# Patient Record
Sex: MALE | Race: WHITE | NOT HISPANIC OR LATINO | Employment: OTHER | URBAN - NONMETROPOLITAN AREA
[De-identification: names, ages, dates, MRNs, and addresses within clinical notes are randomized per-mention and may not be internally consistent; named-entity substitution may affect disease eponyms.]

---

## 2017-07-18 ENCOUNTER — OFFICE VISIT (OUTPATIENT)
Dept: CARDIOLOGY | Facility: CLINIC | Age: 72
End: 2017-07-18

## 2017-07-18 VITALS
HEART RATE: 74 BPM | SYSTOLIC BLOOD PRESSURE: 136 MMHG | HEIGHT: 66 IN | DIASTOLIC BLOOD PRESSURE: 81 MMHG | OXYGEN SATURATION: 97 % | BODY MASS INDEX: 25.84 KG/M2 | WEIGHT: 160.8 LBS

## 2017-07-18 DIAGNOSIS — J44.9 CHRONIC OBSTRUCTIVE PULMONARY DISEASE, UNSPECIFIED COPD TYPE (HCC): ICD-10-CM

## 2017-07-18 DIAGNOSIS — R06.02 SHORTNESS OF BREATH: ICD-10-CM

## 2017-07-18 DIAGNOSIS — Z76.89 ENCOUNTER TO ESTABLISH CARE WITH NEW DOCTOR: ICD-10-CM

## 2017-07-18 DIAGNOSIS — I10 ESSENTIAL HYPERTENSION: ICD-10-CM

## 2017-07-18 DIAGNOSIS — I25.119 CORONARY ARTERY DISEASE INVOLVING NATIVE CORONARY ARTERY OF NATIVE HEART WITH ANGINA PECTORIS (HCC): Primary | ICD-10-CM

## 2017-07-18 DIAGNOSIS — Z72.0 TOBACCO USE: ICD-10-CM

## 2017-07-18 DIAGNOSIS — E78.2 MIXED HYPERLIPIDEMIA: ICD-10-CM

## 2017-07-18 PROBLEM — I25.10 CORONARY ARTERY DISEASE: Status: ACTIVE | Noted: 2017-07-18

## 2017-07-18 PROBLEM — E11.9 DIABETES (HCC): Status: ACTIVE | Noted: 2017-07-18

## 2017-07-18 PROCEDURE — 99204 OFFICE O/P NEW MOD 45 MIN: CPT | Performed by: INTERNAL MEDICINE

## 2017-07-18 RX ORDER — CARVEDILOL 3.12 MG/1
3.12 TABLET ORAL 2 TIMES DAILY WITH MEALS
COMMUNITY
End: 2017-07-18

## 2017-07-18 RX ORDER — ATORVASTATIN CALCIUM 40 MG/1
40 TABLET, FILM COATED ORAL DAILY
Qty: 90 TABLET | Refills: 3 | Status: SHIPPED | OUTPATIENT
Start: 2017-07-18 | End: 2017-07-18 | Stop reason: SDUPTHER

## 2017-07-18 RX ORDER — LISINOPRIL 10 MG/1
10 TABLET ORAL
COMMUNITY
End: 2021-05-13 | Stop reason: DRUGHIGH

## 2017-07-18 RX ORDER — CLOPIDOGREL BISULFATE 75 MG/1
75 TABLET ORAL DAILY
COMMUNITY
End: 2017-10-26 | Stop reason: SINTOL

## 2017-07-18 RX ORDER — CARVEDILOL 6.25 MG/1
6.25 TABLET ORAL 2 TIMES DAILY WITH MEALS
Qty: 180 TABLET | Refills: 3 | Status: SHIPPED | OUTPATIENT
Start: 2017-07-18 | End: 2018-01-22 | Stop reason: SDUPTHER

## 2017-07-18 RX ORDER — SIMVASTATIN 20 MG
20 TABLET ORAL NIGHTLY
COMMUNITY
End: 2017-07-18

## 2017-07-18 RX ORDER — ATORVASTATIN CALCIUM 40 MG/1
40 TABLET, FILM COATED ORAL DAILY
Qty: 90 TABLET | Refills: 3 | Status: SHIPPED | OUTPATIENT
Start: 2017-07-18 | End: 2018-09-10 | Stop reason: SDUPTHER

## 2017-07-18 NOTE — PROGRESS NOTES
Subjective   Ronnell Dover is a 72 y.o. male     Chief Complaint   Patient presents with   • Establish Care     presents as a new patient    • Surgical Clearance     colonoscopy       PROBLEM LIST:     1. Coronary artery disease   1.1 Stenting in circumflex 08/2010 by Dr. MAHONEY and stenting RCA 10/20/2010 Dr. MAHONEY   2. COPD   3. Daily tobacco habituation; 1/2 PPD   4. Diabetes   5. Essential Hypertension   6. Mixed Hyperlipidemia     Specialty Problems     None            HPI:  Mr. Ronnell Dover is a 72-year-old white male for by Vanessa Castillo for preoperative risk assessment and to establish cardiac care.    Mr. Dover has known coronary artery disease.  He underwent stenting of the circumflex in the distant past, and stenting of the right coronary artery by Dr. Canela in 2015.  Circumstance was precipitated by angina.  Since his stenting procedure he is done well.  Currently, Mr. Dover denies any type of chest discomfort.  He relates orthopnea, PND, or lower extremity edema.  He has no palpitations, dizziness, presyncope, or syncope.    The patient describes one flight dyspnea but has been a lifelong smoker.  He frequently coughs and wheezes when he is short of breath.  He has had no recent change in his exercise capacity but describes a gradual decline in physical ability, and increase in exertional dyspnea over 2-3 years.  Mr. Dover does not claudicate, and he has no other symptoms of peripheral arterial disease or of arterial embolic events.  He recently stopped taking aspirin because of abdominal discomfort.              CURRENT MEDICATION:    Current Outpatient Prescriptions   Medication Sig Dispense Refill   • carvedilol (COREG) 6.25 MG tablet Take 1 tablet by mouth 2 (Two) Times a Day With Meals. 180 tablet 3   • clopidogrel (PLAVIX) 75 MG tablet Take 75 mg by mouth Daily.     • lisinopril (PRINIVIL,ZESTRIL) 10 MG tablet Take 10 mg by mouth Daily.     • metFORMIN (GLUCOPHAGE) 1000 MG tablet Take 1,000  mg by mouth 2 (Two) Times a Day With Meals.     • atorvastatin (LIPITOR) 40 MG tablet Take 1 tablet by mouth Daily. 90 tablet 3     No current facility-administered medications for this visit.        ALLERGIES:    Penicillins    PAST MEDICAL HISTORY:    Past Medical History:   Diagnosis Date   • Diabetes mellitus    • Hyperlipidemia    • Hypertension        SURGICAL HISTORY:    Past Surgical History:   Procedure Laterality Date   • CARDIAC CATHETERIZATION     • CORONARY STENT PLACEMENT         SOCIAL HISTORY:    Social History     Social History   • Marital status:      Spouse name: N/A   • Number of children: N/A   • Years of education: N/A     Occupational History   • Not on file.     Social History Main Topics   • Smoking status: Current Every Day Smoker     Packs/day: 1.00     Types: Cigarettes   • Smokeless tobacco: Never Used   • Alcohol use No   • Drug use: No   • Sexual activity: Defer     Other Topics Concern   • Not on file     Social History Narrative   • No narrative on file       FAMILY HISTORY:    Family History   Problem Relation Age of Onset   • No Known Problems Mother    • Heart disease Father        Review of Systems   Constitutional: Positive for fatigue and unexpected weight change ( has had weight change recently within the last year, is scheduled for a colonoscopy , 20 lb weight loss within last year ). Negative for chills and diaphoresis.   HENT: Positive for sore throat.    Eyes: Positive for visual disturbance (wears glasses).   Respiratory: Positive for cough (COPD), shortness of breath (with  min. exertion, progessively worsening, positive for orthopnea, onset 1 year ago ) and wheezing (COPD). Negative for choking, chest tightness and stridor.    Cardiovascular: Negative for chest pain, palpitations and leg swelling.   Gastrointestinal: Positive for abdominal pain (has had stomach pain, patient stopped asa 81mg on own and pain has resolved ) and constipation (occasional ). Negative  "for blood in stool (no melena, no hematuria, no hematemesis, no hematochezia ), diarrhea, nausea and vomiting.        Left sided pain, rates pain as 5, associated symptoms numbness/ burning.     Left sided hernia      Endocrine: Negative.  Negative for cold intolerance and heat intolerance.   Genitourinary: Negative.    Musculoskeletal: Positive for myalgias (nocturnal leg cramps ).   Skin: Negative.    Allergic/Immunologic: Negative.  Negative for environmental allergies, food allergies and immunocompromised state.   Neurological: Negative.  Negative for dizziness, seizures, syncope, facial asymmetry (no stroke like symptoms ), speech difficulty, weakness, light-headedness, numbness and headaches.   Hematological: Bruises/bleeds easily.   Psychiatric/Behavioral: Negative.        VISIT VITALS:    /81 (BP Location: Left arm, Patient Position: Sitting)  Pulse 74  Ht 66\" (167.6 cm)  Wt 160 lb 12.8 oz (72.9 kg)  SpO2 97%  BMI 25.95 kg/m2    RECENT LABS:    Objective       Physical Exam   Constitutional: He is oriented to person, place, and time. He appears well-developed and well-nourished. No distress.   HENT:   Head: Normocephalic and atraumatic.   Eyes: Conjunctivae, EOM and lids are normal. Pupils are equal, round, and reactive to light. Lids are everted and swept, no foreign bodies found.   Wears glasses  Negative ptosis   Negative arcus senilis   Negative lid lag      Neck: Normal range of motion. Neck supple. Normal carotid pulses, no hepatojugular reflux and no JVD present. Carotid bruit is not present. No thyroid mass and no thyromegaly present.   Cardiovascular: S1 normal, S2 normal, normal heart sounds, intact distal pulses and normal pulses.   No extrasystoles are present. Exam reveals no gallop, no S3, no S4, no distant heart sounds, no friction rub and no decreased pulses.    No murmur heard.   No systolic murmur is present    No diastolic murmur is present   Pulses:       Radial pulses are 2+ " on the right side, and 2+ on the left side.        Dorsalis pedis pulses are 2+ on the right side, and 2+ on the left side.        Posterior tibial pulses are 2+ on the right side, and 2+ on the left side.   Pulmonary/Chest: Effort normal. No respiratory distress. He has decreased breath sounds (moderately decreased, exp. phase mildly prolonged). He has no wheezes. He has no rhonchi. He has no rales. He exhibits no tenderness.   Abdominal: Soft. Bowel sounds are normal. He exhibits no distension, no abdominal bruit and no mass. There is no tenderness.   Negative organomegaly      Musculoskeletal: He exhibits no edema (capillary refill min. delayed about 7 seconds LLE, normal capillary refill RLE), tenderness or deformity.   Lymphadenopathy:     He has no cervical adenopathy.     He has no axillary adenopathy.   Neurological: He is alert and oriented to person, place, and time. He has normal reflexes.   Skin: Skin is warm. No rash noted. No erythema. No pallor.   Psychiatric: He has a normal mood and affect. His speech is normal and behavior is normal. Judgment and thought content normal. Cognition and memory are normal.   Nursing note and vitals reviewed.      Procedures      Assessment/Plan #1.  Mr. Dover is at low risk for colonoscopy in can stop his Plavix 5 days before that procedure.  He was cautioned to report immediately or activated emergency medical services for any recurrence of chest discomfort.    #2.  Mr. Dover does not need to be on dual antiplatelet therapy at this time.  We will continue Plavix for the present, may attempt to discontinue that medication and restart low-dose aspirin at the time of his next visit.    #3.  Coreg dosing is inappropriate.  We will increase Coreg to 6.25 mg twice daily and follow heart rate and blood pressure closely.    #4.  Mr. Dover has multivessel coronary artery disease.  He should be on high-dose statin.  We will stop simvastatin and start atorvastatin 40 mg  daily with fasting lipid profile liver enzymes in 6-8 weeks.  The patient was given precautions reference muscle and joint pain also weakness etc.    #5.  Mr. Dover will follow-up with Vanessa Castillo as instructed, and in our office in 2-3 months or on a when necessary basis for medication intolerance or symptoms as discussed in detail today.                 Diagnosis Plan   1. Coronary artery disease involving native coronary artery of native heart with angina pectoris  Lipid Panel    Comprehensive Metabolic Panel    Lipid Panel    Comprehensive Metabolic Panel   2. Tobacco use  Lipid Panel    Comprehensive Metabolic Panel    Lipid Panel    Comprehensive Metabolic Panel   3. Shortness of breath  Lipid Panel    Comprehensive Metabolic Panel    Lipid Panel    Comprehensive Metabolic Panel   4. Essential hypertension  Lipid Panel    Comprehensive Metabolic Panel    Lipid Panel    Comprehensive Metabolic Panel   5. Mixed hyperlipidemia  Lipid Panel    Comprehensive Metabolic Panel    Lipid Panel    Comprehensive Metabolic Panel   6. Encounter to establish care with new doctor     7. Chronic obstructive pulmonary disease, unspecified COPD type         Return in about 3 months (around 10/18/2017), or if symptoms worsen or fail to improve, for Next scheduled follow up.         Umberto Dover MD

## 2017-10-26 ENCOUNTER — OFFICE VISIT (OUTPATIENT)
Dept: CARDIOLOGY | Facility: CLINIC | Age: 72
End: 2017-10-26

## 2017-10-26 VITALS
BODY MASS INDEX: 26.2 KG/M2 | DIASTOLIC BLOOD PRESSURE: 72 MMHG | WEIGHT: 163 LBS | SYSTOLIC BLOOD PRESSURE: 145 MMHG | OXYGEN SATURATION: 94 % | HEART RATE: 66 BPM | HEIGHT: 66 IN

## 2017-10-26 DIAGNOSIS — I25.119 CORONARY ARTERY DISEASE INVOLVING NATIVE CORONARY ARTERY OF NATIVE HEART WITH ANGINA PECTORIS (HCC): ICD-10-CM

## 2017-10-26 DIAGNOSIS — E78.2 MIXED HYPERLIPIDEMIA: Primary | ICD-10-CM

## 2017-10-26 DIAGNOSIS — I10 ESSENTIAL HYPERTENSION: ICD-10-CM

## 2017-10-26 DIAGNOSIS — J44.9 CHRONIC OBSTRUCTIVE PULMONARY DISEASE, UNSPECIFIED COPD TYPE (HCC): ICD-10-CM

## 2017-10-26 DIAGNOSIS — R06.02 SHORTNESS OF BREATH: ICD-10-CM

## 2017-10-26 DIAGNOSIS — Z72.0 TOBACCO USE: ICD-10-CM

## 2017-10-26 PROCEDURE — 99214 OFFICE O/P EST MOD 30 MIN: CPT | Performed by: INTERNAL MEDICINE

## 2017-10-26 RX ORDER — ALBUTEROL SULFATE 2.5 MG/3ML
2.5 SOLUTION RESPIRATORY (INHALATION) AS NEEDED
COMMUNITY
Start: 2017-10-10 | End: 2021-05-13

## 2017-10-26 RX ORDER — BUDESONIDE AND FORMOTEROL FUMARATE DIHYDRATE 160; 4.5 UG/1; UG/1
2 AEROSOL RESPIRATORY (INHALATION)
COMMUNITY
Start: 2017-09-11 | End: 2021-05-13 | Stop reason: DRUGHIGH

## 2017-10-26 RX ORDER — ASPIRIN 81 MG/1
81 TABLET ORAL DAILY
Qty: 30 TABLET | Refills: 11 | Status: SHIPPED | OUTPATIENT
Start: 2017-10-26

## 2017-10-26 RX ORDER — ALBUTEROL SULFATE 90 UG/1
2 AEROSOL, METERED RESPIRATORY (INHALATION) AS NEEDED
Refills: 0 | COMMUNITY
Start: 2017-10-12 | End: 2019-01-21

## 2017-10-26 NOTE — PROGRESS NOTES
Subjective   Ronnell Dover is a 72 y.o. male     Chief Complaint   Patient presents with   • Follow-up     patient appears in office today for 3 month follow up    • Coronary Artery Disease     patient has H/O CAD ; states currently controlled with current medications       PROBLEM LIST:   1. Coronary artery disease   1.1 Stenting in circumflex 08/2010 by Dr. MAHONEY and stenting RCA 10/20/2010 Dr. MAHONEY   2. COPD   3. Daily tobacco habituation; 1/2 PPD   4. Diabetes   5. Essential Hypertension   6. Mixed Hyperlipidemia      Specialty Problems        Cardiology Problems    Coronary artery disease        Essential hypertension        Mixed hyperlipidemia                HPI:  Mr. Dover returns for follow-up on coronary artery disease and cardiac risk factor modification.    At the time of his last visit we cleared him for colonoscopy.  He had no difficulty with that procedure and had a single polyp removed.  Mr. Dover continues to be without chest pain.  He denies orthopnea, PND, or lower extremity edema although he sometimes wakes up at night short of breath coughing and wheezing.  He uses his inhalers during those periods in the symptoms rapidly resolved.  He denies palpitations, dizziness, presyncope, or syncope.  He denies claudication or other symptoms of peripheral arterial disease or of arterial embolic events.    Recent labs demonstrated fasting glucose of 160 BUN and creatinine of 12 and 1.02, HDL 40 LDL 58.      CURRENT MEDICATION:    Current Outpatient Prescriptions   Medication Sig Dispense Refill   • albuterol (PROVENTIL) (2.5 MG/3ML) 0.083% nebulizer solution Take 2.5 mg by nebulization As Needed for Wheezing or Shortness of Air.     • atorvastatin (LIPITOR) 40 MG tablet Take 1 tablet by mouth Daily. 90 tablet 3   • carvedilol (COREG) 6.25 MG tablet Take 1 tablet by mouth 2 (Two) Times a Day With Meals. 180 tablet 3   • lisinopril (PRINIVIL,ZESTRIL) 10 MG tablet Take 10 mg by mouth Daily.     • metFORMIN  (GLUCOPHAGE) 1000 MG tablet Take 1,000 mg by mouth 2 (Two) Times a Day With Meals.     • SYMBICORT 160-4.5 MCG/ACT inhaler Inhale 2 puffs 2 (Two) Times a Day.     • VENTOLIN  (90 Base) MCG/ACT inhaler Inhale 2 puffs As Needed for Wheezing or Shortness of Air.  0   • aspirin 81 MG EC tablet Take 1 tablet by mouth Daily. 30 tablet 11     No current facility-administered medications for this visit.        ALLERGIES:    Penicillins    PAST MEDICAL HISTORY:    Past Medical History:   Diagnosis Date   • COPD (chronic obstructive pulmonary disease)    • Coronary artery disease    • Diabetes mellitus    • Hyperlipidemia    • Hypertension        SURGICAL HISTORY:    Past Surgical History:   Procedure Laterality Date   • CARDIAC CATHETERIZATION     • COLONOSCOPY W/ BIOPSIES      2017   • CORONARY STENT PLACEMENT         SOCIAL HISTORY:    Social History     Social History   • Marital status:      Spouse name: N/A   • Number of children: N/A   • Years of education: N/A     Occupational History   • Not on file.     Social History Main Topics   • Smoking status: Current Every Day Smoker     Packs/day: 1.00     Types: Cigarettes   • Smokeless tobacco: Never Used   • Alcohol use No   • Drug use: No   • Sexual activity: Defer     Other Topics Concern   • Not on file     Social History Narrative       FAMILY HISTORY:    Family History   Problem Relation Age of Onset   • No Known Problems Mother    • Heart disease Father    • Stroke Brother        Review of Systems   Constitutional: Negative.  Negative for fatigue.   HENT: Positive for rhinorrhea (daily). Negative for congestion, nosebleeds, sinus pain, sinus pressure, sneezing and sore throat.    Eyes: Positive for visual disturbance (wears glasses daily).   Respiratory: Positive for cough (productive cough; H/O COPD/tobacco use), chest tightness (daily ; due to H/O COPD/tobacco use), shortness of breath (at rest on occasion and worse with exertion) and wheezing  "(daily; worse on exertion ; H/O COPD/tobacco use).         H/O COPD     Cardiovascular: Negative.  Negative for chest pain (denies CP, no failure synpmtoms), palpitations (denies palpitations) and leg swelling.   Gastrointestinal: Negative.  Negative for abdominal pain, constipation, diarrhea, nausea and vomiting.   Endocrine: Negative.  Negative for cold intolerance, heat intolerance, polyphagia and polyuria.   Genitourinary: Negative.  Negative for difficulty urinating, frequency and urgency.   Musculoskeletal: Positive for arthralgias (shoulders/hips). Negative for back pain, myalgias, neck pain and neck stiffness.   Skin: Negative.  Negative for rash and wound.   Allergic/Immunologic: Negative.  Negative for environmental allergies and food allergies.   Neurological: Negative.  Negative for dizziness, weakness, light-headedness and headaches.   Hematological: Bruises/bleeds easily (bruises and bleeds easily, ear bleeds on plavix).   Psychiatric/Behavioral: Negative for agitation, confusion and sleep disturbance (pt states he does wake up on occasion smothering/SOA). The patient is not nervous/anxious.        VISIT VITALS:    /72 (BP Location: Left arm, Patient Position: Sitting)  Pulse 66  Ht 66\" (167.6 cm)  Wt 163 lb (73.9 kg)  SpO2 94%  BMI 26.31 kg/m2    RECENT LABS:    Objective       Physical Exam   Constitutional: He is oriented to person, place, and time. He appears well-developed and well-nourished. No distress.   HENT:   Head: Normocephalic and atraumatic.   Eyes: Conjunctivae, EOM and lids are normal. Pupils are equal, round, and reactive to light. Lids are everted and swept, no foreign bodies found.   Neck: Normal range of motion. Neck supple. Normal carotid pulses, no hepatojugular reflux and no JVD present. Carotid bruit is present (soft right ). No tracheal deviation present. No thyroid mass and no thyromegaly present.   Cardiovascular: Normal rate, regular rhythm, S1 normal, S2 normal, " normal heart sounds and intact distal pulses.   No extrasystoles are present. Exam reveals no gallop, no S3, no S4, no distant heart sounds and no friction rub.    No murmur heard.   No systolic murmur is present    No diastolic murmur is present   Pulses:       Radial pulses are 2+ on the right side, and 2+ on the left side.        Dorsalis pedis pulses are 2+ on the right side, and 2+ on the left side.        Posterior tibial pulses are 2+ on the right side, and 2+ on the left side.   Pulmonary/Chest: Effort normal. He has decreased breath sounds (moderatly decreased, EXP. mild prolonged).   Abdominal: Soft. Bowel sounds are normal. He exhibits no distension and no mass. There is no tenderness. There is no rebound and no guarding.   Musculoskeletal: Normal range of motion. He exhibits no edema, tenderness or deformity.   Neurological: He is alert and oriented to person, place, and time.   Skin: Skin is warm and dry. No rash noted. No erythema. No pallor.   Psychiatric: He has a normal mood and affect. His behavior is normal. Judgment and thought content normal.   Nursing note and vitals reviewed.      Procedures      Assessment/Plan   #1.  Coronary artery disease currently asymptomatic of ischemia, heart failure, or dysrhythmia at moderate levels of physical activity.  The patient is on appropriate medications from the standpoint of risk factor modification, cholesterol is at goal, blood pressures only mildly elevated.    #2.  The pressures would need to be followed closely.  The patient is not consistently in the 120s in 130s in systole it would recommend medication increase.    #3.  Mr. Dover has tolerated change to atorvastatin well and has no myalgias or arthralgias.  We will continue in that regard.    #4.  The patient describes a recent bleed related to a act in his auditory canal.  He will trial stopping Plavix and go back to aspirin enteric-coated 81 mg daily with a meal.  He continues PPI therapy on an  as-needed basis for dyspepsia.    #5.  The standpoint of lifestyle modification continues cigarette smoking needs to be addressed.    #6.  The patient will follow-up with Vanessa Castillo as instructed, and in our office in one year or on a when necessary basis for symptoms as discussed in detail today.   Diagnosis Plan   1. Mixed hyperlipidemia     2. Essential hypertension     3. Coronary artery disease involving native coronary artery of native heart with angina pectoris     4. Shortness of breath     5. Chronic obstructive pulmonary disease, unspecified COPD type     6. Tobacco use         Return in about 1 year (around 10/26/2018), or if symptoms worsen or fail to improve, for Next scheduled follow up.         Umberto Dover MD

## 2018-01-22 ENCOUNTER — OFFICE VISIT (OUTPATIENT)
Dept: CARDIOLOGY | Facility: CLINIC | Age: 73
End: 2018-01-22

## 2018-01-22 VITALS
HEIGHT: 66 IN | HEART RATE: 82 BPM | WEIGHT: 161 LBS | BODY MASS INDEX: 25.88 KG/M2 | DIASTOLIC BLOOD PRESSURE: 79 MMHG | OXYGEN SATURATION: 91 % | SYSTOLIC BLOOD PRESSURE: 173 MMHG

## 2018-01-22 DIAGNOSIS — R07.2 PRECORDIAL PAIN: ICD-10-CM

## 2018-01-22 DIAGNOSIS — I25.119 CORONARY ARTERY DISEASE INVOLVING NATIVE CORONARY ARTERY OF NATIVE HEART WITH ANGINA PECTORIS (HCC): ICD-10-CM

## 2018-01-22 DIAGNOSIS — E78.2 MIXED HYPERLIPIDEMIA: ICD-10-CM

## 2018-01-22 DIAGNOSIS — I10 ESSENTIAL HYPERTENSION: Primary | ICD-10-CM

## 2018-01-22 DIAGNOSIS — R06.02 SOB (SHORTNESS OF BREATH): ICD-10-CM

## 2018-01-22 PROCEDURE — 99214 OFFICE O/P EST MOD 30 MIN: CPT | Performed by: PHYSICIAN ASSISTANT

## 2018-01-22 RX ORDER — CLONIDINE HYDROCHLORIDE 0.1 MG/1
TABLET ORAL
COMMUNITY
Start: 2018-01-15 | End: 2018-02-14

## 2018-01-22 RX ORDER — CLOPIDOGREL BISULFATE 75 MG/1
TABLET ORAL DAILY
COMMUNITY
Start: 2017-11-26

## 2018-01-22 RX ORDER — CARVEDILOL 6.25 MG/1
12.5 TABLET ORAL 2 TIMES DAILY WITH MEALS
Qty: 180 TABLET | Refills: 3 | Status: SHIPPED | OUTPATIENT
Start: 2018-01-22

## 2018-01-22 NOTE — PROGRESS NOTES
Problem list     Subjective   Ronnell Dover is a 73 y.o. male   CC:  Chest pain.  Chief Complaint   Patient presents with   • Hypertension     patient states he is having HTN with current medications   PROBLEM LIST:   1. Coronary artery disease   1.1 Stenting in circumflex 08/2010 by Dr. MAHONEY and stenting RCA 10/20/2010 Dr. MAHONEY   1.2 Stenting, 1999, inadequate data.  2. COPD   3. Daily tobacco habituation; 1/2 PPD   4. Diabetes   5. Essential Hypertension   6. Mixed Hyperlipidemia     HPI  The patient presents back today at his request.  He has started noticing left upper chest discomfort and shoulder discomfort as well.  He reports that he has had increasing dyspnea and fatigue.  He feels exactly like he did prior to his stenting in 2010 (of the circumflex and the RCA).  He's very concerned that he has unstable angina and would like to pursue further evaluation.  The patient has no failure or dysrhythmic symptoms.  He reports that his symptoms occur with exertion and are relieved with rest.  He has not taken nitroglycerin at this time.  The chest pain is occurring at lower levels of activity.  He has no further complaints otherwise.    Current Outpatient Prescriptions   Medication Sig Dispense Refill   • albuterol (PROVENTIL) (2.5 MG/3ML) 0.083% nebulizer solution Take 2.5 mg by nebulization As Needed for Wheezing or Shortness of Air.     • aspirin 81 MG EC tablet Take 1 tablet by mouth Daily. 30 tablet 11   • atorvastatin (LIPITOR) 40 MG tablet Take 1 tablet by mouth Daily. 90 tablet 3   • carvedilol (COREG) 6.25 MG tablet Take 2 tablets by mouth 2 (Two) Times a Day With Meals. 180 tablet 3   • CloNIDine (CATAPRES) 0.1 MG tablet prn     • clopidogrel (PLAVIX) 75 MG tablet Daily.     • lisinopril (PRINIVIL,ZESTRIL) 10 MG tablet Take 10 mg by mouth 2 (Two) Times a Day.     • metFORMIN (GLUCOPHAGE) 1000 MG tablet Take 1,000 mg by mouth 2 (Two) Times a Day With Meals.     • SYMBICORT 160-4.5 MCG/ACT inhaler Inhale 2  "puffs 2 (Two) Times a Day.     • VENTOLIN  (90 Base) MCG/ACT inhaler Inhale 2 puffs As Needed for Wheezing or Shortness of Air.  0     No current facility-administered medications for this visit.        Penicillins    Past Medical History:   Diagnosis Date   • Angina pectoris    • Cancer     Skin   • COPD (chronic obstructive pulmonary disease)    • Coronary artery disease    • Diabetes mellitus    • Hyperlipidemia    • Hypertension        Social History     Social History   • Marital status:      Spouse name: N/A   • Number of children: N/A   • Years of education: N/A     Occupational History   • Not on file.     Social History Main Topics   • Smoking status: Current Every Day Smoker     Packs/day: 1.00     Types: Cigarettes   • Smokeless tobacco: Never Used   • Alcohol use No   • Drug use: No   • Sexual activity: Defer     Other Topics Concern   • Not on file     Social History Narrative       Family History   Problem Relation Age of Onset   • No Known Problems Mother    • Heart disease Father    • Stroke Brother        Review of Systems   Constitutional: Positive for fatigue.   HENT: Negative.    Eyes: Positive for visual disturbance (glasses).   Respiratory: Shortness of breath: Hx COPD.    Cardiovascular: Positive for chest pain (left chest into shoulder). Negative for palpitations and leg swelling.   Gastrointestinal: Positive for abdominal pain.   Endocrine: Negative.    Genitourinary: Negative.    Musculoskeletal: Positive for arthralgias and myalgias.   Skin: Negative.    Allergic/Immunologic: Negative.    Neurological: Positive for dizziness, light-headedness and numbness (left hand).   Hematological: Bruises/bleeds easily.   Psychiatric/Behavioral: Positive for sleep disturbance.       Objective   Vitals:    01/22/18 1526   BP: 173/79   BP Location: Left arm   Patient Position: Sitting   Pulse: 82   SpO2: 91%   Weight: 73 kg (161 lb)   Height: 167.6 cm (65.98\")      /79 (BP Location: " "Left arm, Patient Position: Sitting)  Pulse 82  Ht 167.6 cm (65.98\")  Wt 73 kg (161 lb)  SpO2 91%  BMI 26 kg/m2   Lab Results (most recent)     None        Physical Exam   Constitutional: He is oriented to person, place, and time. He appears well-developed and well-nourished. No distress.   HENT:   Head: Normocephalic and atraumatic.   Eyes: Conjunctivae, EOM and lids are normal. Pupils are equal, round, and reactive to light. Lids are everted and swept, no foreign bodies found.   Neck: Normal range of motion. Neck supple. Normal carotid pulses, no hepatojugular reflux and no JVD present. Carotid bruit is present (soft right ). No tracheal deviation present. No thyroid mass and no thyromegaly present.   Cardiovascular: Normal rate, regular rhythm, S1 normal, S2 normal, normal heart sounds and intact distal pulses.   No extrasystoles are present. Exam reveals no gallop, no S3, no S4, no distant heart sounds and no friction rub.    No murmur heard.   No systolic murmur is present    No diastolic murmur is present   Pulses:       Radial pulses are 2+ on the right side, and 2+ on the left side.        Dorsalis pedis pulses are 2+ on the right side, and 2+ on the left side.        Posterior tibial pulses are 2+ on the right side, and 2+ on the left side.   Pulmonary/Chest: Effort normal. He has decreased breath sounds (moderatly decreased, EXP. mild prolonged).   Abdominal: Soft. Bowel sounds are normal. He exhibits no distension and no mass. There is no tenderness. There is no rebound and no guarding.   Musculoskeletal: Normal range of motion. He exhibits no edema, tenderness or deformity.   Neurological: He is alert and oriented to person, place, and time.   Skin: Skin is warm and dry. No rash noted. No erythema. No pallor.   Psychiatric: He has a normal mood and affect. His behavior is normal. Judgment and thought content normal.   Nursing note and vitals reviewed.        Procedure   Procedures   "     Assessment/Plan      Diagnosis Plan   1. Essential hypertension  Cardiac catheterization    carvedilol (COREG) 6.25 MG tablet   2. Precordial pain  Cardiac catheterization    carvedilol (COREG) 6.25 MG tablet   3. Coronary artery disease involving native coronary artery of native heart with angina pectoris  Cardiac catheterization    carvedilol (COREG) 6.25 MG tablet   4. Mixed hyperlipidemia  Cardiac catheterization    carvedilol (COREG) 6.25 MG tablet   5. SOB (shortness of breath)  Cardiac catheterization    carvedilol (COREG) 6.25 MG tablet       I feel that the patient is having symptoms compatible with unstable angina.  Symptoms mimic what he had prior to previous stenting as above.  He will restart Plavix.  He'll continue aspirin and statin therapy.  I will increase carvedilol to 12.5 mg twice a day for further cardioprotection and blood pressure control as well.  He will need definitive evaluation of coronary anatomy given low level symptoms.  He will be scheduled for catheterization.  Should he have symptoms before catheterization, he will proceed on to the emergency room.               Electronically signed by:

## 2018-01-22 NOTE — PATIENT INSTRUCTIONS
HOLD METFORMIN X 24 HOURS PRIOR TO CATH    CONT ASA AND PLAVIX    INCREASE CARVEDILOL TO 12.5 MG TWICE DAILY      Coronary Angiogram With Stent  Coronary angiogram with stent placement is a procedure to widen or open a narrow blood vessel of the heart (coronary artery). Arteries may become blocked by cholesterol buildup (plaques) in the lining or wall. When a coronary artery becomes partially blocked, blood flow to that area decreases. This may lead to chest pain or a heart attack (myocardial infarction).  A stent is a small piece of metal that looks like mesh or a spring. Stent placement may be done as treatment for a heart attack or right after a coronary angiogram in which a blocked artery is found.  Let your health care provider know about:  · Any allergies you have.  · All medicines you are taking, including vitamins, herbs, eye drops, creams, and over-the-counter medicines.  · Any problems you or family members have had with anesthetic medicines.  · Any blood disorders you have.  · Any surgeries you have had.  · Any medical conditions you have.  · Whether you are pregnant or may be pregnant.  What are the risks?  Generally, this is a safe procedure. However, problems may occur, including:  · Damage to the heart or its blood vessels.  · A return of blockage.  · Bleeding, infection, or bruising at the insertion site.  · A collection of blood under the skin (hematoma) at the insertion site.  · A blood clot in another part of the body.  · Kidney injury.  · Allergic reaction to the dye or contrast that is used.  · Bleeding into the abdomen (retroperitoneal bleeding).  What happens before the procedure?  Staying hydrated   Follow instructions from your health care provider about hydration, which may include:  · Up to 2 hours before the procedure - you may continue to drink clear liquids, such as water, clear fruit juice, black coffee, and plain tea.  Eating and drinking restrictions   Follow instructions from your  health care provider about eating and drinking, which may include:  · 8 hours before the procedure - stop eating heavy meals or foods such as meat, fried foods, or fatty foods.  · 6 hours before the procedure - stop eating light meals or foods, such as toast or cereal.  · 2 hours before the procedure - stop drinking clear liquids.  Ask your health care provider about:  · Changing or stopping your regular medicines. This is especially important if you are taking diabetes medicines or blood thinners.  · Taking medicines such as ibuprofen. These medicines can thin your blood. Do not take these medicines before your procedure if your health care provider instructs you not to. Generally, aspirin is recommended before a procedure of passing a small, thin tube (catheter) through a blood vessel and into the heart (cardiac catheterization).  What happens during the procedure?  · An IV tube will be inserted into one of your veins.  · You will be given one or more of the following:  ¨ A medicine to help you relax (sedative).  ¨ A medicine to numb the area where the catheter will be inserted into an artery (local anesthetic).  · To reduce your risk of infection:  ¨ Your health care team will wash or sanitize their hands.  ¨ Your skin will be washed with soap.  ¨ Hair may be removed from the area where the catheter will be inserted.  · Using a guide wire, the catheter will be inserted into an artery. The location may be in your groin, in your wrist, or in the fold of your arm (near your elbow).  · A type of X-ray (fluoroscopy) will be used to help guide the catheter to the opening of the arteries in the heart.  · A dye will be injected into the catheter, and X-rays will be taken. The dye will help to show where any narrowing or blockages are located in the arteries.  · A tiny wire will be guided to the blocked spot, and a balloon will be inflated to make the artery wider.  · The stent will be expanded and will crush the plaques  into the wall of the vessel. The stent will hold the area open and improve the blood flow. Most stents have a drug coating to reduce the risk of the stent narrowing over time.  · The artery may be made wider using a drill, laser, or other tools to remove plaques.  · When the blood flow is better, the catheter will be removed. The lining of the artery will grow over the stent, which stays where it was placed.  This procedure may vary among health care providers and hospitals.  What happens after the procedure?  · If the procedure is done through the leg, you will be kept in bed lying flat for about 6 hours. You will be instructed to not bend and not cross your legs.  · The insertion site will be checked frequently.  · The pulse in your foot or wrist will be checked frequently.  · You may have additional blood tests, X-rays, and a test that records the electrical activity of your heart (electrocardiogram, or ECG).  This information is not intended to replace advice given to you by your health care provider. Make sure you discuss any questions you have with your health care provider.  Document Released: 06/23/2004 Document Revised: 08/17/2017 Document Reviewed: 07/23/2017  Elsevier Interactive Patient Education © 2017 [employer]

## 2018-02-08 ENCOUNTER — OUTSIDE FACILITY SERVICE (OUTPATIENT)
Dept: CARDIOLOGY | Facility: CLINIC | Age: 73
End: 2018-02-08

## 2018-02-08 PROCEDURE — 93458 L HRT ARTERY/VENTRICLE ANGIO: CPT | Performed by: INTERNAL MEDICINE

## 2018-02-14 ENCOUNTER — OFFICE VISIT (OUTPATIENT)
Dept: CARDIOLOGY | Facility: CLINIC | Age: 73
End: 2018-02-14

## 2018-02-14 VITALS
OXYGEN SATURATION: 93 % | HEIGHT: 66 IN | SYSTOLIC BLOOD PRESSURE: 146 MMHG | BODY MASS INDEX: 26.03 KG/M2 | DIASTOLIC BLOOD PRESSURE: 75 MMHG | HEART RATE: 71 BPM | WEIGHT: 162 LBS

## 2018-02-14 DIAGNOSIS — I10 ESSENTIAL HYPERTENSION: ICD-10-CM

## 2018-02-14 DIAGNOSIS — R06.02 SHORTNESS OF BREATH: ICD-10-CM

## 2018-02-14 DIAGNOSIS — I25.10 CORONARY ARTERY DISEASE INVOLVING NATIVE CORONARY ARTERY OF NATIVE HEART WITHOUT ANGINA PECTORIS: Primary | ICD-10-CM

## 2018-02-14 DIAGNOSIS — J44.9 CHRONIC OBSTRUCTIVE PULMONARY DISEASE, UNSPECIFIED COPD TYPE (HCC): ICD-10-CM

## 2018-02-14 DIAGNOSIS — R07.2 PRECORDIAL PAIN: Primary | ICD-10-CM

## 2018-02-14 PROCEDURE — 99213 OFFICE O/P EST LOW 20 MIN: CPT | Performed by: PHYSICIAN ASSISTANT

## 2018-02-14 RX ORDER — AMLODIPINE BESYLATE 5 MG/1
TABLET ORAL DAILY
Refills: 0 | COMMUNITY
Start: 2018-01-27 | End: 2018-02-14 | Stop reason: SDUPTHER

## 2018-02-14 RX ORDER — NITROGLYCERIN 0.4 MG/1
0.4 TABLET SUBLINGUAL
Qty: 25 TABLET | Refills: 3 | Status: SHIPPED | OUTPATIENT
Start: 2018-02-14

## 2018-02-14 RX ORDER — AMLODIPINE BESYLATE 5 MG/1
5 TABLET ORAL DAILY
Qty: 90 TABLET | Refills: 3 | Status: SHIPPED | OUTPATIENT
Start: 2018-02-14 | End: 2019-01-21

## 2018-02-14 RX ORDER — HYDROCHLOROTHIAZIDE 25 MG/1
25 TABLET ORAL DAILY
Qty: 30 TABLET | Refills: 11 | Status: SHIPPED | OUTPATIENT
Start: 2018-02-14 | End: 2018-02-20 | Stop reason: SDUPTHER

## 2018-02-14 RX ORDER — NITROGLYCERIN 0.4 MG/1
0.4 TABLET SUBLINGUAL
Qty: 25 TABLET | Refills: 1 | Status: SHIPPED | OUTPATIENT
Start: 2018-02-14 | End: 2018-02-14 | Stop reason: SDUPTHER

## 2018-02-14 RX ORDER — NITROGLYCERIN 0.4 MG/1
0.4 TABLET SUBLINGUAL
COMMUNITY
End: 2018-02-14 | Stop reason: SDUPTHER

## 2018-02-14 NOTE — PROGRESS NOTES
Problem list     Subjective   Ronnell Dover is a 73 y.o. male     Chief Complaint   Patient presents with   • Coronary Artery Disease     Here for heart cath. f/u   • Hypertension   • Hyperlipidemia   • COPD   • Diabetes       HPI    PROBLEM LIST:   1. Coronary artery disease   1.1 Stenting in circumflex 08/2010 by Dr. MAHONEY and stenting RCA 10/20/2010 Dr. MAHONEY   1.2 Stenting, 1999, inadequate data.  1.3 cardiac catheterization February 2018 demonstrating a chronic total occlusion of the right coronary artery with collateralization noted.  Nonobstructive disease otherwise and medical measures recommended  2. COPD   3. Daily tobacco habituation; 1/2 PPD   4. Diabetes   5. Essential Hypertension   6. Mixed Hyperlipidemia      Patient is a 73-year-old male that presents back from catheterization.  Cardiac catheterization demonstrated a chronic total occlusion of the right coronary artery.  Nonobstructive disease otherwise.  Collateralization noted with medical management recommended.    Patient describes feeling well.  He has no chest pain or pressure.  He is arm pain has even improved.  He has moderate levels of dyspnea but describes that being improved with inhaled corticosteroids.  He does occasionally sense orthopnea.  He doesn't palpitate or have dysrhythmic symptoms.  Otherwise is doing well      Outpatient Encounter Prescriptions as of 2/14/2018   Medication Sig Dispense Refill   • albuterol (PROVENTIL) (2.5 MG/3ML) 0.083% nebulizer solution Take 2.5 mg by nebulization As Needed for Wheezing or Shortness of Air.     • amLODIPine (NORVASC) 5 MG tablet Daily.  0   • aspirin 81 MG EC tablet Take 1 tablet by mouth Daily. 30 tablet 11   • atorvastatin (LIPITOR) 40 MG tablet Take 1 tablet by mouth Daily. 90 tablet 3   • carvedilol (COREG) 6.25 MG tablet Take 2 tablets by mouth 2 (Two) Times a Day With Meals. 180 tablet 3   • clopidogrel (PLAVIX) 75 MG tablet Daily.     • ipratropium (ATROVENT) 0.02 % nebulizer solution  Take 2.5 mL by nebulization 2 (Two) Times a Day. prn 120 mL 3   • lisinopril (PRINIVIL,ZESTRIL) 10 MG tablet Take 10 mg by mouth 2 (Two) Times a Day.     • metFORMIN (GLUCOPHAGE) 1000 MG tablet Take 1,000 mg by mouth 2 (Two) Times a Day With Meals.     • nitroglycerin (NITROSTAT) 0.4 MG SL tablet Place 1 tablet under the tongue Every 5 (Five) Minutes As Needed for Chest Pain. Take no more than 3 doses in 15 minutes. 25 tablet 1   • SYMBICORT 160-4.5 MCG/ACT inhaler Inhale 2 puffs 2 (Two) Times a Day.     • VENTOLIN  (90 Base) MCG/ACT inhaler Inhale 2 puffs As Needed for Wheezing or Shortness of Air.  0   • [DISCONTINUED] ipratropium (ATROVENT) 0.02 % nebulizer solution prn  0   • [DISCONTINUED] nitroglycerin (NITROSTAT) 0.4 MG SL tablet Place 0.4 mg under the tongue Every 5 (Five) Minutes As Needed for Chest Pain. Take no more than 3 doses in 15 minutes.     • hydrochlorothiazide (HYDRODIURIL) 25 MG tablet Take 1 tablet by mouth Daily. 30 tablet 11   • [DISCONTINUED] CloNIDine (CATAPRES) 0.1 MG tablet prn       No facility-administered encounter medications on file as of 2/14/2018.        Penicillins    Past Medical History:   Diagnosis Date   • Angina pectoris    • Cancer     Skin   • COPD (chronic obstructive pulmonary disease)    • Coronary artery disease    • Diabetes mellitus    • Hyperlipidemia    • Hypertension        Social History     Social History   • Marital status:      Spouse name: N/A   • Number of children: N/A   • Years of education: N/A     Occupational History   • Not on file.     Social History Main Topics   • Smoking status: Current Every Day Smoker     Packs/day: 1.00     Types: Cigarettes   • Smokeless tobacco: Never Used   • Alcohol use No   • Drug use: No   • Sexual activity: Defer     Other Topics Concern   • Not on file     Social History Narrative       Family History   Problem Relation Age of Onset   • No Known Problems Mother    • Heart disease Father    • Stroke Brother   "      Review of Systems   Constitutional: Negative.    HENT: Negative.    Eyes: Positive for visual disturbance (glasses).   Respiratory: Positive for shortness of breath.    Cardiovascular: Negative.    Gastrointestinal: Negative.    Endocrine: Negative.    Genitourinary: Negative.    Musculoskeletal: Positive for arthralgias and myalgias.   Skin: Negative.    Allergic/Immunologic: Negative.    Neurological: Negative.    Hematological: Bruises/bleeds easily.   Psychiatric/Behavioral: Positive for sleep disturbance.       Objective   Vitals:    02/14/18 1055   BP: 146/75   BP Location: Left arm   Patient Position: Sitting   Pulse: 71   SpO2: 93%   Weight: 73.5 kg (162 lb)   Height: 167.6 cm (65.98\")      /75 (BP Location: Left arm, Patient Position: Sitting)  Pulse 71  Ht 167.6 cm (65.98\")  Wt 73.5 kg (162 lb)  SpO2 93%  BMI 26.16 kg/m2    Lab Results (most recent)     None          Physical Exam   Constitutional: He is oriented to person, place, and time. He appears well-developed and well-nourished. No distress.   HENT:   Head: Normocephalic and atraumatic.   Eyes: EOM are normal. Pupils are equal, round, and reactive to light.   Neck: No JVD present.   Cardiovascular: Normal rate, regular rhythm, normal heart sounds and intact distal pulses.  Exam reveals no gallop and no friction rub.    No murmur heard.  Pulmonary/Chest: Effort normal and breath sounds normal. No respiratory distress. He has no wheezes. He has no rales. He exhibits no tenderness.   Musculoskeletal: Normal range of motion. He exhibits no edema.   Neurological: He is alert and oriented to person, place, and time. No cranial nerve deficit.   Skin: Skin is warm and dry. No rash noted. No erythema. No pallor.   Psychiatric: He has a normal mood and affect. His behavior is normal.   Nursing note and vitals reviewed.      Procedure   Procedures       Assessment/Plan     Problems Addressed this Visit        Cardiovascular and Mediastinum    " Coronary artery disease - Primary    Relevant Medications    amLODIPine (NORVASC) 5 MG tablet    nitroglycerin (NITROSTAT) 0.4 MG SL tablet    Other Relevant Orders    Basic Metabolic Panel    Essential hypertension    Relevant Medications    amLODIPine (NORVASC) 5 MG tablet    hydrochlorothiazide (HYDRODIURIL) 25 MG tablet    Other Relevant Orders    Basic Metabolic Panel       Respiratory    Shortness of breath    Relevant Orders    Basic Metabolic Panel            Recommendation  1.  We have had a detailed discussion the Dr. Dover also came in for a few minutes to discussed with patient as well.  We do not feel it would be advantageous to refer to a tertiary center for recanalization of the chronic total occlusion based on the fact the patient is asymptomatic.  Therefore, we will continue him.  Therapy.  We would like to try diuretics to help with decreased post stress because of patient's and diastolic pressure of 22 mmHg.  We will start hydrochlorothiazide 25 mg and we'll check a BMP in one week.  2.  Otherwise he is on appropriate medications.  We will see back for follow-up in 2-3 months.  For any change in symptoms, he will contact her office.  Follow-up with primary as scheduled.         Electronically signed by:

## 2018-02-14 NOTE — TELEPHONE ENCOUNTER
PATIENT NEEDING REFILLS ON HIS AMLODIPINE, IPRATROPIUM 0.02%, AND NTG. 04. REFILLED PER REFILL PROTOCOL. PH,LPN

## 2018-02-19 ENCOUNTER — TELEPHONE (OUTPATIENT)
Dept: CARDIOLOGY | Facility: CLINIC | Age: 73
End: 2018-02-19

## 2018-02-19 DIAGNOSIS — I25.10 CORONARY ARTERY DISEASE DUE TO CALCIFIED CORONARY LESION: Primary | ICD-10-CM

## 2018-02-19 DIAGNOSIS — I10 ESSENTIAL HYPERTENSION: ICD-10-CM

## 2018-02-19 DIAGNOSIS — E13.8 DIABETES MELLITUS OF OTHER TYPE WITH COMPLICATION, UNSPECIFIED LONG TERM INSULIN USE STATUS: ICD-10-CM

## 2018-02-19 DIAGNOSIS — E87.1 HYPONATREMIA: ICD-10-CM

## 2018-02-19 DIAGNOSIS — R06.00 DYSPNEA, UNSPECIFIED TYPE: ICD-10-CM

## 2018-02-19 DIAGNOSIS — I25.84 CORONARY ARTERY DISEASE DUE TO CALCIFIED CORONARY LESION: Primary | ICD-10-CM

## 2018-02-19 NOTE — TELEPHONE ENCOUNTER
----- Message from EMANUEL Xiao sent at 2/19/2018  3:41 PM EST -----  Patient started on hydrochlorothiazide because of excess fluid.  Sodium is 133 which is borderline.  Call the patient and let him know we will recheck in 2 weeks to make sure his sodium doesn't continue to decrease.  Renal function is fine  ----- Message -----     From: Bibi Gonzáles     Sent: 2/19/2018   2:10 PM       To: EMANUEL Xiao

## 2018-02-19 NOTE — TELEPHONE ENCOUNTER
PATIENT AWARE NA LITTLE LOW PER RECENT LABS AND OK WITH GETTING REPEAT LABS IN 2 WEEKS. REQUESTED LAB ORDER BE FAXED TO [facility]. ORDER FAXED. PATRICK,LPN

## 2018-02-20 DIAGNOSIS — I10 ESSENTIAL HYPERTENSION: Primary | ICD-10-CM

## 2018-02-20 RX ORDER — HYDROCHLOROTHIAZIDE 25 MG/1
25 TABLET ORAL DAILY
Qty: 90 TABLET | Refills: 2 | Status: SHIPPED | OUTPATIENT
Start: 2018-02-20 | End: 2019-01-21

## 2018-06-14 ENCOUNTER — OFFICE VISIT (OUTPATIENT)
Dept: CARDIOLOGY | Facility: CLINIC | Age: 73
End: 2018-06-14

## 2018-06-14 VITALS
HEART RATE: 62 BPM | DIASTOLIC BLOOD PRESSURE: 61 MMHG | HEIGHT: 66 IN | WEIGHT: 166.2 LBS | SYSTOLIC BLOOD PRESSURE: 97 MMHG | BODY MASS INDEX: 26.71 KG/M2 | OXYGEN SATURATION: 96 %

## 2018-06-14 DIAGNOSIS — I10 ESSENTIAL HYPERTENSION: ICD-10-CM

## 2018-06-14 DIAGNOSIS — R06.02 SHORTNESS OF BREATH: Primary | ICD-10-CM

## 2018-06-14 DIAGNOSIS — I25.10 CORONARY ARTERY DISEASE INVOLVING NATIVE CORONARY ARTERY OF NATIVE HEART WITHOUT ANGINA PECTORIS: ICD-10-CM

## 2018-06-14 PROCEDURE — 99213 OFFICE O/P EST LOW 20 MIN: CPT | Performed by: PHYSICIAN ASSISTANT

## 2018-06-14 NOTE — PROGRESS NOTES
Problem list     Subjective   Ronnell Dover is a 73 y.o. male     Chief Complaint   Patient presents with   • Follow-up     presents for 2-3 month follow up   • Coronary Artery Disease   • Shortness of Breath       HPI    PROBLEM LIST:   1. Coronary artery disease   1.1 Stenting in circumflex 08/2010 by Dr. MAHONEY and stenting RCA 10/20/2010 Dr. MAHONEY   1.2 Stenting, 1999, inadequate data.  1.3 cardiac catheterization February 2018 demonstrating a chronic total occlusion of the right coronary artery with collateralization noted.  Nonobstructive disease otherwise and medical measures recommended  2. COPD   3. Daily tobacco habituation; 1/2 PPD   4. Diabetes   5. Essential Hypertension   6. Mixed Hyperlipidemia     Patient is a 73-year-old male that presents back for follow-up.  He has done remarkably well.  He has no chest pain or chest pressure.  He has mild to moderate levels of shortness of breath is chronic lung disease.  He has no PND orthopnea.  He doesn't palpitate or have dysrhythmic symptoms and otherwise is doing remarkably well      Outpatient Encounter Prescriptions as of 6/14/2018   Medication Sig Dispense Refill   • albuterol (PROVENTIL) (2.5 MG/3ML) 0.083% nebulizer solution Take 2.5 mg by nebulization As Needed for Wheezing or Shortness of Air.     • amLODIPine (NORVASC) 5 MG tablet Take 1 tablet by mouth Daily. 90 tablet 3   • aspirin 81 MG EC tablet Take 1 tablet by mouth Daily. 30 tablet 11   • atorvastatin (LIPITOR) 40 MG tablet Take 1 tablet by mouth Daily. 90 tablet 3   • carvedilol (COREG) 6.25 MG tablet Take 2 tablets by mouth 2 (Two) Times a Day With Meals. 180 tablet 3   • clopidogrel (PLAVIX) 75 MG tablet Daily.     • hydrochlorothiazide (HYDRODIURIL) 25 MG tablet Take 1 tablet by mouth Daily. 90 tablet 2   • ipratropium (ATROVENT) 0.02 % nebulizer solution Take 2.5 mL by nebulization 2 (Two) Times a Day. prn 120 mL 5   • lisinopril (PRINIVIL,ZESTRIL) 10 MG tablet Take 10 mg by mouth 2 (Two) Times  a Day.     • metFORMIN (GLUCOPHAGE) 1000 MG tablet Take 1,000 mg by mouth 2 (Two) Times a Day With Meals.     • nitroglycerin (NITROSTAT) 0.4 MG SL tablet Place 1 tablet under the tongue Every 5 (Five) Minutes As Needed for Chest Pain. Take no more than 3 doses in 15 minutes. 25 tablet 3   • SYMBICORT 160-4.5 MCG/ACT inhaler Inhale 2 puffs 2 (Two) Times a Day.     • VENTOLIN  (90 Base) MCG/ACT inhaler Inhale 2 puffs As Needed for Wheezing or Shortness of Air.  0     No facility-administered encounter medications on file as of 6/14/2018.        Penicillins    Past Medical History:   Diagnosis Date   • Angina pectoris    • Basal cell carcinoma of right side of nose    • Cancer     Skin   • COPD (chronic obstructive pulmonary disease)    • Coronary artery disease    • Diabetes mellitus    • Hyperlipidemia    • Hypertension        Social History     Social History   • Marital status:      Spouse name: N/A   • Number of children: N/A   • Years of education: N/A     Occupational History   • Not on file.     Social History Main Topics   • Smoking status: Current Every Day Smoker     Packs/day: 1.00     Types: Cigarettes   • Smokeless tobacco: Never Used   • Alcohol use No   • Drug use: No   • Sexual activity: Defer     Other Topics Concern   • Not on file     Social History Narrative   • No narrative on file       Family History   Problem Relation Age of Onset   • No Known Problems Mother    • Heart disease Father    • Stroke Brother        Review of Systems   Constitutional: Positive for fatigue.   HENT: Negative.    Eyes: Positive for visual disturbance (wears glasses).   Respiratory: Positive for chest tightness, shortness of breath (easily soa with daily activity due to COPD) and wheezing.         COPD   Cardiovascular: Negative.  Negative for chest pain, palpitations and leg swelling.   Gastrointestinal: Negative.    Endocrine: Negative.    Genitourinary: Negative.    Musculoskeletal: Negative.    Skin:  "Positive for wound (on nose due to recent basal cell excision).   Allergic/Immunologic: Negative.    Neurological: Negative.    Hematological: Bruises/bleeds easily (bruise).   Psychiatric/Behavioral: Positive for sleep disturbance (reports symptoms of sleep apnea, PCP getting patient cpap).   All other systems reviewed and are negative.      Objective   Vitals:    06/14/18 1124   BP: 97/61   BP Location: Left arm   Patient Position: Sitting   Pulse: 62   SpO2: 96%   Weight: 75.4 kg (166 lb 3.2 oz)   Height: 167.6 cm (66\")      BP 97/61 (BP Location: Left arm, Patient Position: Sitting)   Pulse 62   Ht 167.6 cm (66\")   Wt 75.4 kg (166 lb 3.2 oz)   SpO2 96%   BMI 26.83 kg/m²     Lab Results (most recent)     None          Physical Exam   Constitutional: He is oriented to person, place, and time. He appears well-developed and well-nourished. No distress.   HENT:   Head: Normocephalic and atraumatic.   Eyes: EOM are normal. Pupils are equal, round, and reactive to light.   Neck: No JVD present.   Cardiovascular: Normal rate, regular rhythm, normal heart sounds and intact distal pulses.  Exam reveals no gallop and no friction rub.    No murmur heard.  Pulmonary/Chest: Effort normal and breath sounds normal. No respiratory distress. He has no wheezes. He has no rales. He exhibits no tenderness.   Musculoskeletal: Normal range of motion. He exhibits no edema.   Neurological: He is alert and oriented to person, place, and time. No cranial nerve deficit.   Skin: Skin is warm and dry. No rash noted. No erythema. No pallor.   Psychiatric: He has a normal mood and affect. His behavior is normal.   Nursing note and vitals reviewed.      Procedure   Procedures       Assessment/Plan     Problems Addressed this Visit        Cardiovascular and Mediastinum    Coronary artery disease    Essential hypertension       Respiratory    Shortness of breath - Primary            Recommendation   1.  Patient doing remarkably well.  Blood " pressure is slightly low today but otherwise has been doing well.  He is on appropriate medications.Chronic total occlusion of the RCA patient has no anginal symptoms at this time.  However, shortness of breath could be an anginal equivalent the patient has chronic lung disease.  He describes to me that it has not worsened or progressed.  For now we will continue to monitor  2.  We will see him back for follow-up in 6 months or sooner symptoms discussed.  Follow-up primary as scheduled         I advised Ronnell of the risks of continuing to use tobacco, and I provided him with tobacco cessation educational materials in the After Visit Summary.     During this visit, I spent <3minutes counseling the patient regarding tobacco cessation.     Patient's Body mass index is 26.83 kg/m². BMI is within normal parameters. No follow-up required.       Electronically signed by:

## 2018-09-10 RX ORDER — ATORVASTATIN CALCIUM 40 MG/1
TABLET, FILM COATED ORAL
Qty: 90 TABLET | Refills: 3 | Status: SHIPPED | OUTPATIENT
Start: 2018-09-10 | End: 2022-07-26 | Stop reason: ALTCHOICE

## 2019-01-21 ENCOUNTER — OFFICE VISIT (OUTPATIENT)
Dept: CARDIOLOGY | Facility: CLINIC | Age: 74
End: 2019-01-21

## 2019-01-21 VITALS
HEIGHT: 66 IN | BODY MASS INDEX: 25.23 KG/M2 | SYSTOLIC BLOOD PRESSURE: 119 MMHG | OXYGEN SATURATION: 95 % | HEART RATE: 71 BPM | DIASTOLIC BLOOD PRESSURE: 62 MMHG | WEIGHT: 157 LBS

## 2019-01-21 DIAGNOSIS — R06.02 SHORTNESS OF BREATH: ICD-10-CM

## 2019-01-21 DIAGNOSIS — I25.10 CORONARY ARTERY DISEASE INVOLVING NATIVE CORONARY ARTERY OF NATIVE HEART WITHOUT ANGINA PECTORIS: Primary | ICD-10-CM

## 2019-01-21 DIAGNOSIS — I10 ESSENTIAL HYPERTENSION: ICD-10-CM

## 2019-01-21 PROCEDURE — 99213 OFFICE O/P EST LOW 20 MIN: CPT | Performed by: PHYSICIAN ASSISTANT

## 2019-01-21 NOTE — PROGRESS NOTES
Problem list     Subjective   Ronnell Dover is a 74 y.o. male     Chief Complaint   Patient presents with   • Shortness of Breath     presents for 6 month f/u   • Coronary Artery Disease   • Follow-up       HPI        PROBLEM LIST:   1. Coronary artery disease   1.1 Stenting in circumflex 08/2010 by Dr. MAHONEY and stenting RCA 10/20/2010 Dr. MAHONEY   1.2 Stenting, 1999, inadequate data.  1.3 cardiac catheterization February 2018 demonstrating a chronic total occlusion of the right coronary artery with collateralization noted.  Nonobstructive disease otherwise and medical measures recommended  2. COPD   3. Daily tobacco habituation; 1/2 PPD   4. Diabetes   5. Essential Hypertension   6. Mixed Hyperlipidemia          Patient is a 74-year-old male that presents back to the office for follow-up.  He describes doing relatively well.  He recently had an upper respiratory infection this persisted for almost a month per his report.  This required going to the hospital for treatment as well.  Patient is doing better from that standpoint.    He does not experience any chest pain per his report.  He does have mild to moderate levels of dyspnea but has underlying lung disease of COPD.  This has not changed or progressed in any way.  No PND orthopnea.    He doesn't palpitate or have dysrhythmic symptoms.    Patient does describe weight loss.  He describes unintentional weight loss and a lack of appetite.  He has underwent evaluation by primary.  He describes having upper and lower endoscopy as well as scans which have been unremarkable.  Otherwise he is doing well    Outpatient Encounter Medications as of 1/21/2019   Medication Sig Dispense Refill   • albuterol (PROVENTIL) (2.5 MG/3ML) 0.083% nebulizer solution Take 2.5 mg by nebulization As Needed for Wheezing or Shortness of Air.     • aspirin 81 MG EC tablet Take 1 tablet by mouth Daily. 30 tablet 11   • atorvastatin (LIPITOR) 40 MG tablet TAKE 1 TABLET DAILY 90 tablet 3   •  carvedilol (COREG) 6.25 MG tablet Take 2 tablets by mouth 2 (Two) Times a Day With Meals. 180 tablet 3   • clopidogrel (PLAVIX) 75 MG tablet Daily.     • ipratropium (ATROVENT) 0.02 % nebulizer solution Take 500 mcg by nebulization 4 (Four) Times a Day.     • lisinopril (PRINIVIL,ZESTRIL) 10 MG tablet Take 10 mg by mouth 2 (Two) Times a Day.     • metFORMIN (GLUCOPHAGE) 1000 MG tablet Take 1,000 mg by mouth 2 (Two) Times a Day With Meals.     • nitroglycerin (NITROSTAT) 0.4 MG SL tablet Place 1 tablet under the tongue Every 5 (Five) Minutes As Needed for Chest Pain. Take no more than 3 doses in 15 minutes. 25 tablet 3   • SYMBICORT 160-4.5 MCG/ACT inhaler Inhale 2 puffs 2 (Two) Times a Day.     • [DISCONTINUED] amLODIPine (NORVASC) 5 MG tablet Take 1 tablet by mouth Daily. 90 tablet 3   • [DISCONTINUED] hydrochlorothiazide (HYDRODIURIL) 25 MG tablet Take 1 tablet by mouth Daily. 90 tablet 2   • [DISCONTINUED] ipratropium (ATROVENT) 0.02 % nebulizer solution Take 2.5 mL by nebulization 2 (Two) Times a Day. prn 120 mL 5   • [DISCONTINUED] VENTOLIN  (90 Base) MCG/ACT inhaler Inhale 2 puffs As Needed for Wheezing or Shortness of Air.  0     No facility-administered encounter medications on file as of 1/21/2019.        Penicillins    Past Medical History:   Diagnosis Date   • Angina pectoris (CMS/HCC)    • Basal cell carcinoma of right side of nose    • Cancer (CMS/HCC)     Skin   • COPD (chronic obstructive pulmonary disease) (CMS/HCC)    • Coronary artery disease    • Diabetes mellitus (CMS/HCC)    • Hyperlipidemia    • Hypertension    • Skin cancer (melanoma) (CMS/HCC)     spot on ear       Social History     Socioeconomic History   • Marital status:      Spouse name: Not on file   • Number of children: Not on file   • Years of education: Not on file   • Highest education level: Not on file   Social Needs   • Financial resource strain: Not on file   • Food insecurity - worry: Not on file   • Food  "insecurity - inability: Not on file   • Transportation needs - medical: Not on file   • Transportation needs - non-medical: Not on file   Occupational History   • Not on file   Tobacco Use   • Smoking status: Current Every Day Smoker     Packs/day: 1.00     Types: Cigarettes   • Smokeless tobacco: Never Used   Substance and Sexual Activity   • Alcohol use: No   • Drug use: No   • Sexual activity: Defer   Other Topics Concern   • Not on file   Social History Narrative   • Not on file       Family History   Problem Relation Age of Onset   • No Known Problems Mother    • Heart disease Father    • Stroke Brother        Review of Systems   Constitutional: Positive for fatigue.   Eyes: Positive for visual disturbance (wears glasses).   Respiratory: Positive for shortness of breath (COPD with increased activity).    Cardiovascular: Negative.  Negative for chest pain, palpitations and leg swelling.   Gastrointestinal: Negative.    Endocrine: Negative.    Genitourinary: Negative.    Musculoskeletal: Negative.    Skin: Negative.    Allergic/Immunologic: Negative.    Neurological: Negative.    Hematological: Bruises/bleeds easily.   Psychiatric/Behavioral: Negative.    All other systems reviewed and are negative.      Objective   Vitals:    01/21/19 1452   BP: 119/62   BP Location: Left arm   Patient Position: Sitting   Cuff Size: Adult   Pulse: 71   SpO2: 95%   Weight: 71.2 kg (157 lb)   Height: 167.6 cm (66\")      /62 (BP Location: Left arm, Patient Position: Sitting, Cuff Size: Adult)   Pulse 71   Ht 167.6 cm (66\")   Wt 71.2 kg (157 lb)   SpO2 95%   BMI 25.34 kg/m²     Lab Results (most recent)     None          Physical Exam   Constitutional: He is oriented to person, place, and time. He appears well-developed and well-nourished. No distress.   HENT:   Head: Normocephalic and atraumatic.   Eyes: EOM are normal. Pupils are equal, round, and reactive to light.   Neck: No JVD present.   Cardiovascular: Normal rate, " regular rhythm, normal heart sounds and intact distal pulses. Exam reveals no gallop and no friction rub.   No murmur heard.  Pulmonary/Chest: Effort normal and breath sounds normal. No respiratory distress. He has no wheezes. He has no rales. He exhibits no tenderness.   Musculoskeletal: Normal range of motion. He exhibits no edema.   Neurological: He is alert and oriented to person, place, and time. No cranial nerve deficit.   Skin: Skin is warm and dry. No rash noted. No erythema. No pallor.   Psychiatric: He has a normal mood and affect. His behavior is normal.   Nursing note and vitals reviewed.      Procedure   Procedures       Assessment/Plan     Problems Addressed this Visit        Cardiovascular and Mediastinum    Coronary artery disease - Primary    Essential hypertension       Respiratory    Shortness of breath           recommendation  1.  74-year-old male with history of coronary disease and stenting with chronic total occlusion of the right coronary artery without symptoms of angina, failure or arrhythmia.  We will continue risk factor modification.  2.  Lipids and diabetes are managed by primary.  Patient describes recently having atorvastatin increased.  He is following closely with primary.  3.  In regards to weight loss.  I discussed with him to continue to follow-up with primary.  4.  We will see him back for follow-up in 6 months to year or sooner symptoms discussed.  Follow-up primary as scheduled                Patient's Body mass index is 25.34 kg/m². BMI is within normal parameters. No follow-up required..       Electronically signed by:

## 2021-05-13 ENCOUNTER — HOSPITAL ENCOUNTER (OUTPATIENT)
Dept: CARDIOLOGY | Facility: HOSPITAL | Age: 76
Discharge: HOME | End: 2021-05-13

## 2021-05-13 ENCOUNTER — OFFICE VISIT (OUTPATIENT)
Dept: CARDIOLOGY | Facility: CLINIC | Age: 76
End: 2021-05-13

## 2021-05-13 VITALS
HEART RATE: 65 BPM | DIASTOLIC BLOOD PRESSURE: 74 MMHG | WEIGHT: 136.8 LBS | OXYGEN SATURATION: 99 % | BODY MASS INDEX: 21.98 KG/M2 | SYSTOLIC BLOOD PRESSURE: 161 MMHG | HEIGHT: 66 IN

## 2021-05-13 DIAGNOSIS — I10 ESSENTIAL HYPERTENSION: ICD-10-CM

## 2021-05-13 DIAGNOSIS — E78.2 MIXED HYPERLIPIDEMIA: ICD-10-CM

## 2021-05-13 DIAGNOSIS — I25.10 CORONARY ARTERY DISEASE INVOLVING NATIVE CORONARY ARTERY OF NATIVE HEART WITHOUT ANGINA PECTORIS: Primary | ICD-10-CM

## 2021-05-13 DIAGNOSIS — I50.9 ACUTE CONGESTIVE HEART FAILURE, UNSPECIFIED HEART FAILURE TYPE (HCC): ICD-10-CM

## 2021-05-13 DIAGNOSIS — I25.10 CORONARY ARTERY DISEASE INVOLVING NATIVE CORONARY ARTERY OF NATIVE HEART WITHOUT ANGINA PECTORIS: ICD-10-CM

## 2021-05-13 DIAGNOSIS — Z72.0 TOBACCO USE: ICD-10-CM

## 2021-05-13 DIAGNOSIS — R06.02 SHORTNESS OF BREATH: ICD-10-CM

## 2021-05-13 PROCEDURE — 93306 TTE W/DOPPLER COMPLETE: CPT

## 2021-05-13 PROCEDURE — 99204 OFFICE O/P NEW MOD 45 MIN: CPT | Performed by: INTERNAL MEDICINE

## 2021-05-13 PROCEDURE — 93306 TTE W/DOPPLER COMPLETE: CPT | Performed by: INTERNAL MEDICINE

## 2021-05-13 RX ORDER — TIOTROPIUM BROMIDE INHALATION SPRAY 3.12 UG/1
SPRAY, METERED RESPIRATORY (INHALATION) DAILY
COMMUNITY
Start: 2021-03-26

## 2021-05-13 RX ORDER — AZITHROMYCIN 250 MG/1
250 TABLET, FILM COATED ORAL
COMMUNITY

## 2021-05-13 RX ORDER — ALBUTEROL SULFATE 90 UG/1
2 AEROSOL, METERED RESPIRATORY (INHALATION) EVERY 4 HOURS PRN
COMMUNITY

## 2021-05-13 RX ORDER — AMLODIPINE BESYLATE 2.5 MG/1
2.5 TABLET ORAL DAILY
Qty: 30 TABLET | Refills: 11 | Status: SHIPPED | OUTPATIENT
Start: 2021-05-13 | End: 2021-07-06

## 2021-05-13 RX ORDER — ARFORMOTEROL TARTRATE 15 UG/2ML
SOLUTION RESPIRATORY (INHALATION) 2 TIMES DAILY
COMMUNITY
Start: 2021-03-11

## 2021-05-13 RX ORDER — PREDNISONE 1 MG/1
TABLET ORAL DAILY
COMMUNITY
Start: 2021-04-22

## 2021-05-13 RX ORDER — ATORVASTATIN CALCIUM 10 MG/1
TABLET, FILM COATED ORAL NIGHTLY
COMMUNITY
Start: 2021-05-03 | End: 2021-05-13 | Stop reason: DRUGHIGH

## 2021-05-13 RX ORDER — LISINOPRIL AND HYDROCHLOROTHIAZIDE 25; 20 MG/1; MG/1
1 TABLET ORAL DAILY
COMMUNITY
Start: 2021-05-05 | End: 2021-05-18 | Stop reason: ALTCHOICE

## 2021-05-13 RX ORDER — BUDESONIDE 0.5 MG/2ML
INHALANT ORAL 2 TIMES DAILY
COMMUNITY
Start: 2021-03-11

## 2021-05-13 RX ORDER — ROFLUMILAST 500 UG/1
TABLET ORAL DAILY
COMMUNITY
Start: 2021-03-26

## 2021-05-13 NOTE — PATIENT INSTRUCTIONS
For more information:    Quit Now [place]  1-800-QUIT-NOW  https://[place].quitlogix.org/en-[place]/  Steps to Quit Smoking  Smoking tobacco can be harmful to your health and can affect almost every organ in your body. Smoking puts you, and those around you, at risk for developing many serious chronic diseases. Quitting smoking is difficult, but it is one of the best things that you can do for your health. It is never too late to quit.  What are the benefits of quitting smoking?  When you quit smoking, you lower your risk of developing serious diseases and conditions, such as:  · Lung cancer or lung disease, such as COPD.  · Heart disease.  · Stroke.  · Heart attack.  · Infertility.  · Osteoporosis and bone fractures.  Additionally, symptoms such as coughing, wheezing, and shortness of breath may get better when you quit. You may also find that you get sick less often because your body is stronger at fighting off colds and infections. If you are pregnant, quitting smoking can help to reduce your chances of having a baby of low birth weight.  How do I get ready to quit?  When you decide to quit smoking, create a plan to make sure that you are successful. Before you quit:  · Pick a date to quit. Set a date within the next two weeks to give you time to prepare.  · Write down the reasons why you are quitting. Keep this list in places where you will see it often, such as on your bathroom mirror or in your car or wallet.  · Identify the people, places, things, and activities that make you want to smoke (triggers) and avoid them. Make sure to take these actions:  ¨ Throw away all cigarettes at home, at work, and in your car.  ¨ Throw away smoking accessories, such as ashtrays and lighters.  ¨ Clean your car and make sure to empty the ashtray.  ¨ Clean your home, including curtains and carpets.  · Tell your family, friends, and coworkers that you are quitting. Support from your loved ones can make quitting easier.  · Talk with  your health care provider about your options for quitting smoking.  · Find out what treatment options are covered by your health insurance.  What strategies can I use to quit smoking?  Talk with your healthcare provider about different strategies to quit smoking. Some strategies include:  · Quitting smoking altogether instead of gradually lessening how much you smoke over a period of time. Research shows that quitting “cold turkey” is more successful than gradually quitting.  · Attending in-person counseling to help you build problem-solving skills. You are more likely to have success in quitting if you attend several counseling sessions. Even short sessions of 10 minutes can be effective.  · Finding resources and support systems that can help you to quit smoking and remain smoke-free after you quit. These resources are most helpful when you use them often. They can include:  ¨ Online chats with a counselor.  ¨ Telephone quitlines.  ¨ Printed self-help materials.  ¨ Support groups or group counseling.  ¨ Text messaging programs.  ¨ Mobile phone applications.  · Taking medicines to help you quit smoking. (If you are pregnant or breastfeeding, talk with your health care provider first.) Some medicines contain nicotine and some do not. Both types of medicines help with cravings, but the medicines that include nicotine help to relieve withdrawal symptoms. Your health care provider may recommend:  ¨ Nicotine patches, gum, or lozenges.  ¨ Nicotine inhalers or sprays.  ¨ Non-nicotine medicine that is taken by mouth.  Talk with your health care provider about combining strategies, such as taking medicines while you are also receiving in-person counseling. Using these two strategies together makes you more likely to succeed in quitting than if you used either strategy on its own.  If you are pregnant or breastfeeding, talk with your health care provider about finding counseling or other support strategies to quit smoking. Do  not take medicine to help you quit smoking unless told to do so by your health care provider.  What things can I do to make it easier to quit?  Quitting smoking might feel overwhelming at first, but there is a lot that you can do to make it easier. Take these important actions:  · Reach out to your family and friends and ask that they support and encourage you during this time. Call telephone quitlines, reach out to support groups, or work with a counselor for support.  · Ask people who smoke to avoid smoking around you.  · Avoid places that trigger you to smoke, such as bars, parties, or smoke-break areas at work.  · Spend time around people who do not smoke.  · Lessen stress in your life, because stress can be a smoking trigger for some people. To lessen stress, try:  ¨ Exercising regularly.  ¨ Deep-breathing exercises.  ¨ Yoga.  ¨ Meditating.  ¨ Performing a body scan. This involves closing your eyes, scanning your body from head to toe, and noticing which parts of your body are particularly tense. Purposefully relax the muscles in those areas.  · Download or purchase mobile phone or tablet apps (applications) that can help you stick to your quit plan by providing reminders, tips, and encouragement. There are many free apps, such as QuitGuide from the CDC (Centers for Disease Control and Prevention). You can find other support for quitting smoking (smoking cessation) through smokefree.gov and other websites.  How will I feel when I quit smoking?  Within the first 24 hours of quitting smoking, you may start to feel some withdrawal symptoms. These symptoms are usually most noticeable 2-3 days after quitting, but they usually do not last beyond 2-3 weeks. Changes or symptoms that you might experience include:  · Mood swings.  · Restlessness, anxiety, or irritation.  · Difficulty concentrating.  · Dizziness.  · Strong cravings for sugary foods in addition to nicotine.  · Mild weight  gain.  · Constipation.  · Nausea.  · Coughing or a sore throat.  · Changes in how your medicines work in your body.  · A depressed mood.  · Difficulty sleeping (insomnia).  After the first 2-3 weeks of quitting, you may start to notice more positive results, such as:  · Improved sense of smell and taste.  · Decreased coughing and sore throat.  · Slower heart rate.  · Lower blood pressure.  · Clearer skin.  · The ability to breathe more easily.  · Fewer sick days.  Quitting smoking is very challenging for most people. Do not get discouraged if you are not successful the first time. Some people need to make many attempts to quit before they achieve long-term success. Do your best to stick to your quit plan, and talk with your health care provider if you have any questions or concerns.  This information is not intended to replace advice given to you by your health care provider. Make sure you discuss any questions you have with your health care provider.  Document Released: 12/12/2002 Document Revised: 08/15/2017 Document Reviewed: 05/03/2016  [employer] Interactive Patient Education © 2017 [employer]  Amlodipine Oral Tablets  What is this medicine?  AMLODIPINE (am MICHELLE di peen) is a calcium channel blocker. It relaxes your blood vessels and decreases the amount of work the heart has to do. It treats high blood pressure and/or prevents chest pain (also called angina).  This medicine may be used for other purposes; ask your health care provider or pharmacist if you have questions.  COMMON BRAND NAME(S): Norvasc  What should I tell my health care provider before I take this medicine?  They need to know if you have any of these conditions:  · heart disease  · liver disease  · an unusual or allergic reaction to amlodipine, other drugs, foods, dyes, or preservatives  · pregnant or trying to get pregnant  · breast-feeding  How should I use this medicine?  Take this drug by mouth. Take it as directed on the prescription label  at the same time every day. You can take it with or without food. If it upsets your stomach, take it with food. Keep taking it unless your health care provider tells you to stop.  Talk to your health care provider about the use of this drug in children. While it may be prescribed for children as young as 6 for selected conditions, precautions do apply.  Overdosage: If you think you have taken too much of this medicine contact a poison control center or emergency room at once.  NOTE: This medicine is only for you. Do not share this medicine with others.  What if I miss a dose?  If you miss a dose, take it as soon as you can. If it is almost time for your next dose, take only that dose. Do not take double or extra doses.  What may interact with this medicine?  This medicine may interact with the following medications:  · clarithromycin  · cyclosporine  · diltiazem  · itraconazole  · simvastatin  · tacrolimus  This list may not describe all possible interactions. Give your health care provider a list of all the medicines, herbs, non-prescription drugs, or dietary supplements you use. Also tell them if you smoke, drink alcohol, or use illegal drugs. Some items may interact with your medicine.  What should I watch for while using this medicine?  Visit your health care provider for regular checks on your progress. Check your blood pressure as directed. Ask your health care provider what your blood pressure should be. Also, find out when you should contact him or her.  Do not treat yourself for coughs, colds, or pain while you are using this drug without asking your health care provider for advice. Some drugs may increase your blood pressure.  You may get drowsy or dizzy. Do not drive, use machinery, or do anything that needs mental alertness until you know how this drug affects you. Do not stand up or sit up quickly, especially if you are an older patient. This reduces the risk of dizzy or fainting spells.  What side  effects may I notice from receiving this medicine?  Side effects that you should report to your doctor or health care provider as soon as possible:  · allergic reactions (skin rash, itching or hives; swelling of the face, lips, or tongue)  · heart attack (trouble breathing; pain or tightness in the chest, neck, back or arms; unusually weak or tired)  · low blood pressure (dizziness; feeling faint or lightheaded, falls; unusually weak or tired)  Side effects that usually do not require medical attention (report these to your doctor or health care provider if they continue or are bothersome):  · facial flushing  · nausea  · palpitations  · stomach pain  · sudden weight gain  · swelling of the ankles, feet, hands  This list may not describe all possible side effects. Call your doctor for medical advice about side effects. You may report side effects to FDA at 3-782-FDA-2883.  Where should I keep my medicine?  Keep out of the reach of children and pets.  Store at room temperature between 59 and 86 degrees F (15 and 30 degrees C). Protect from light and moisture. Keep the container tightly closed. Throw away any unused drug after the expiration date.  NOTE: This sheet is a summary. It may not cover all possible information. If you have questions about this medicine, talk to your doctor, pharmacist, or health care provider.  © 2021 [employer]vier/Gold Standard (2020-09-22 19:39:45)

## 2021-05-13 NOTE — PROGRESS NOTES
Subjective   Ronnell Dover is a 76 y.o. male     Chief Complaint   Patient presents with   • Follow-up     Here for hosp. f/u   • Shortness of Breath   • Congestive Heart Failure       PROBLEM LIST:     1. Coronary artery disease   1.1 Stenting in circumflex 08/2010 by Dr. MAHONEY and stenting RCA 10/20/2010 Dr. MAHONEY   1.2 Stenting, 1999, inadequate data.  1.3 cardiac catheterization February 2018 demonstrating a chronic total occlusion of the right coronary artery with collateralization noted.  Nonobstructive disease otherwise and medical measures recommended  2. COPD   3. Daily tobacco habituation; 1/2 PPD   4. Diabetes   5. Essential Hypertension   6. Mixed Hyperlipidemia       Specialty Problems        Cardiology Problems    Coronary artery disease        Essential hypertension        Mixed hyperlipidemia                HPI:  Mr. Dover is seen today in follow-up after recent ER evaluation in [facility].    Mr. Dover presented with worsened exertional and rest dyspnea as well as orthopnea.  BNP ranged from 7-900 and the patient was felt to be in heart failure.  However, there is no mention of cardiomegaly or pulmonary congestion and certainly not pulmonary edema by either chest x-ray or CT of the chest.  Mr. Dover was given antibiotics, and was diuresed.  He took 1 day of Lasix at home, had profuse urinary output, and developed severe headache and weakness.  Symptoms resolve with oral hydration.    The patient has known coronary artery disease with chronic total occlusion of the right coronary artery and left to right collaterals.  He had none of the ischemic symptoms with his current episodes that he had previously with exacerbations of coronary artery disease.  Specifically Mr. Dover denies any chest pain, or change in his functional capacity.  He has not had minimal lower extremity edema which has not changed over time.  He still feels somewhat more short of breath than baseline but he is steadily  improving since his emergency room evaluation.                      PRIOR MEDICATIONS    Current Outpatient Medications on File Prior to Visit   Medication Sig Dispense Refill   • albuterol sulfate  (90 Base) MCG/ACT inhaler Inhale 2 puffs Every 4 (Four) Hours As Needed for Wheezing.     • aspirin 81 MG EC tablet Take 1 tablet by mouth Daily. 30 tablet 11   • atorvastatin (LIPITOR) 40 MG tablet TAKE 1 TABLET DAILY (Patient taking differently: Take 10 mg by mouth Every Night.) 90 tablet 3   • Brovana 15 MCG/2ML nebulizer solution 2 (Two) Times a Day.     • budesonide (PULMICORT) 0.5 MG/2ML nebulizer solution 2 (Two) Times a Day.     • carvedilol (COREG) 6.25 MG tablet Take 2 tablets by mouth 2 (Two) Times a Day With Meals. 180 tablet 3   • clopidogrel (PLAVIX) 75 MG tablet Daily.     • Daliresp 500 MCG tablet tablet Daily.     • lisinopril-hydrochlorothiazide (PRINZIDE,ZESTORETIC) 20-25 MG per tablet Take 1 tablet by mouth Daily.     • metFORMIN (GLUCOPHAGE) 1000 MG tablet Take 1,000 mg by mouth 2 (Two) Times a Day With Meals.     • predniSONE (DELTASONE) 5 MG tablet Daily.     • Spiriva Respimat 2.5 MCG/ACT aerosol solution inhaler Daily.     • nitroglycerin (NITROSTAT) 0.4 MG SL tablet Place 1 tablet under the tongue Every 5 (Five) Minutes As Needed for Chest Pain. Take no more than 3 doses in 15 minutes. 25 tablet 3   • [DISCONTINUED] albuterol (PROVENTIL) (2.5 MG/3ML) 0.083% nebulizer solution Take 2.5 mg by nebulization As Needed for Wheezing or Shortness of Air.     • [DISCONTINUED] atorvastatin (LIPITOR) 10 MG tablet Every Night.     • [DISCONTINUED] ipratropium (ATROVENT) 0.02 % nebulizer solution Take 500 mcg by nebulization 4 (Four) Times a Day.     • [DISCONTINUED] lisinopril (PRINIVIL,ZESTRIL) 10 MG tablet Take 10 mg by mouth 2 (Two) Times a Day.     • [DISCONTINUED] SYMBICORT 160-4.5 MCG/ACT inhaler Inhale 2 puffs 2 (Two) Times a Day.       No current facility-administered medications on file  prior to visit.       ALLERGIES:    Penicillins    PAST MEDICAL HISTORY:    Past Medical History:   Diagnosis Date   • Angina pectoris (CMS/HCC)    • Basal cell carcinoma of right side of nose    • Cancer (CMS/HCC)     Skin   • CHF (congestive heart failure) (CMS/HCC)    • COPD (chronic obstructive pulmonary disease) (CMS/HCC)    • Coronary artery disease    • Diabetes mellitus (CMS/HCC)    • Hyperlipidemia    • Hypertension    • Skin cancer (melanoma) (CMS/HCC)     spot on ear       SURGICAL HISTORY:    Past Surgical History:   Procedure Laterality Date   • BASAL CELL CARCINOMA EXCISION     • CARDIAC CATHETERIZATION     • COLONOSCOPY W/ BIOPSIES      2017   • CORONARY STENT PLACEMENT         SOCIAL HISTORY:    Social History     Socioeconomic History   • Marital status:      Spouse name: Not on file   • Number of children: Not on file   • Years of education: Not on file   • Highest education level: Not on file   Tobacco Use   • Smoking status: Current Every Day Smoker     Packs/day: 1.00     Types: Cigarettes   • Smokeless tobacco: Never Used   Substance and Sexual Activity   • Alcohol use: No   • Drug use: No   • Sexual activity: Defer       FAMILY HISTORY:    Family History   Problem Relation Age of Onset   • No Known Problems Mother    • Heart disease Father    • Stroke Brother        Review of Systems   Constitutional: Negative.    HENT: Negative.    Eyes: Positive for visual disturbance (wears glasses).   Respiratory: Positive for cough and shortness of breath (wears 02 at 3 L via N/C).    Cardiovascular: Positive for leg swelling (mildly left same for last couple years). Negative for chest pain and palpitations.   Gastrointestinal: Negative.    Endocrine: Negative.    Genitourinary: Negative.    Musculoskeletal: Positive for gait problem (in w/c).   Skin: Negative.    Allergic/Immunologic: Negative.    Neurological: Negative for dizziness, syncope and light-headedness.   Hematological: Bruises/bleeds  "easily.   Psychiatric/Behavioral: Positive for sleep disturbance.       VISIT VITALS:  Vitals:    05/13/21 0848   BP: 161/74   BP Location: Left arm   Patient Position: Sitting   Pulse: 65   SpO2: 99%   Weight: 62.1 kg (136 lb 12.8 oz)   Height: 167.6 cm (66\")      /74 (BP Location: Left arm, Patient Position: Sitting)   Pulse 65   Ht 167.6 cm (66\")   Wt 62.1 kg (136 lb 12.8 oz)   SpO2 99%   BMI 22.08 kg/m²     RECENT LABS:    Objective       Physical Exam  Vitals and nursing note reviewed.   Constitutional:       General: He is not in acute distress.     Appearance: He is well-developed.   HENT:      Head: Normocephalic and atraumatic.   Eyes:      Conjunctiva/sclera: Conjunctivae normal.      Pupils: Pupils are equal, round, and reactive to light.   Neck:      Vascular: No carotid bruit, hepatojugular reflux or JVD.      Trachea: No tracheal deviation.      Comments: Nl. Carotid upstrokes  Cardiovascular:      Rate and Rhythm: Normal rate and regular rhythm.      Pulses:           Radial pulses are 2+ on the right side and 2+ on the left side.      Heart sounds: S1 normal and S2 normal. Murmur heard.   No friction rub. Gallop present. S4 sounds present. No S3 sounds.       Comments: 1-2/6 TR  Pulmonary:      Effort: Pulmonary effort is normal.      Breath sounds: Decreased breath sounds (mod. - severe) present. No wheezing, rhonchi or rales.      Comments: No consolidation    Abdominal:      General: Bowel sounds are normal. There is no distension or abdominal bruit.      Palpations: Abdomen is soft. There is no mass.      Tenderness: There is no abdominal tenderness. There is no guarding or rebound.      Comments: No organomegaly   Musculoskeletal:         General: No tenderness or deformity. Normal range of motion.      Cervical back: Normal range of motion and neck supple.      Comments: LLE, no edema, palpable pedal pulses  RLE, no edema, palpable pedal pulses   Skin:     General: Skin is warm and " "dry.      Coloration: Skin is not pale.      Findings: No erythema or rash.   Neurological:      Mental Status: He is alert and oriented to person, place, and time.   Psychiatric:         Behavior: Behavior normal.         Thought Content: Thought content normal.         Judgment: Judgment normal.         Procedures      Assessment/Plan   #1.  Coronary artery disease.  By cath in 2018 the patient had chronic total occlusion of the right coronary artery as ostium which could not be opened by conservative measures.  The right coronary artery was well collateralized from the left and EF was 45 to 50%.  The patient has had no ischemic symptoms.  Therefore, we will continue current therapy.    2.  \"Congestive heart failure\".  BNP was as high as 900 but chest x-ray and CT of the chest failed to mention pulmonary congestion or pulmonary edema and diuretics because no improvement in symptoms.  In fact, the patient felt worse after short course diuretic therapy.  We will perform echocardiography to reassess LV systolic and diastolic function, LV filling pressures, and pulmonary pressures given the clinical scenario described above.  We may recommend adjusting medications based on findings.    3.  Systemic hypertension.  We will add amlodipine 2.5 mg daily and the patient was given precautions reference lower extremity edema and orthostasis.  The patient will uptitrate medications as required through Dr. Hunter's office.    4.  Weight loss.  I asked the patient to discuss possible etiologies with Dr. Cai.    5.  We will plan on seeing Mr. Dover in follow-up in 6 to 12 months or on a as needed basis for symptoms or echo findings as discussed in detail today, and he will follow with Dr. Cai as instructed.   Diagnosis Plan   1. Coronary artery disease involving native coronary artery of native heart without angina pectoris     2. Essential hypertension     3. Mixed hyperlipidemia     4. Shortness of breath     5. " Tobacco use         No follow-ups on file.         Ronnell Dover  reports that he has been smoking cigarettes. He has been smoking about 1.00 pack per day. He has never used smokeless tobacco.. I have educated him on the risk of diseases from using tobacco products such as cancer, COPD and heart disease.     I advised him to quit and he is willing to quit. We have discussed the following method/s for tobacco cessation:  Education Material.  Together we have set a quit date for by next appt. .  He will follow up with me in as scheduled  or sooner to check on his progress.    I spent 3  minutes counseling the patient.          Patient's Body mass index is 22.08 kg/m². indicating that he is within normal range (BMI 18.5-24.9). No BMI management plan needed..       Rhina Castro LPN    Scribed for Dr. Umberto Dover by Rhina Castro LPN May 13, 2021 09:03 EDT         Electronically signed by:            This note is dictated utilizing voice recognition software.  Although this record has been proof read, transcriptional errors may still be present. If questions occur regarding the content of this record please do not hesitate to call our office.

## 2021-05-17 DIAGNOSIS — I10 ESSENTIAL HYPERTENSION: Primary | ICD-10-CM

## 2021-05-17 NOTE — TELEPHONE ENCOUNTER
Leida Bro, Heidi Cordova  Caller: Unspecified (Today, 11:07 AM)  Stanislaw is back tomorrow.  Let him know that you will get back with him tomorrow I am leary of stopping the HCTZ.         Called and informed pt of the above, he checked VS and they're currently:148/65 HR 73.

## 2021-05-17 NOTE — TELEPHONE ENCOUNTER
"Pt LVM asking to D/C the Lisinopril-HCTZ and to just take Lisinopril. Stated that he doesn't have the heart condition that he was put on it for. Stated the water pill is causing issues.       Called pt, he denied having any edema. Stated that the water pill is causing him to urinate \"every 5 min.\" Stated that he saw Dr. Dover and was told it's not a heart problem and that he has plenty of Lisinopril without the fluid in it.   "

## 2021-05-17 NOTE — TELEPHONE ENCOUNTER
Leida Bro APRN [place], Emily  Caller: Unspecified (Today, 11:07 AM)  I don't see the results of his echo showing his EF.  Typically is EF is less than normal, which it has been in the past they need some form of water pill.  How is the patient's BP currently running?          Called pt and he stated his BP runs 150-something/80-something. Stated he doesn't usually keep track of BP. Doesn't check HR at all.

## 2021-05-18 RX ORDER — LISINOPRIL 20 MG/1
20 TABLET ORAL DAILY
Qty: 30 TABLET | Refills: 11
Start: 2021-05-18 | End: 2021-06-15 | Stop reason: SDUPTHER

## 2021-05-18 NOTE — TELEPHONE ENCOUNTER
PATIENT CALLED, AWARE TO D/C HCTZ PORTION OF LISINOPRIL/HCTZ TAB AND ONLY TAKE LISINOPRIL 20 MG DAILY. HE STATES HE HAS PLENTY OF 10 MG TABS, WILL JUST USE THEM, AND WILL CALL THE OFFICE WHEN REFILLS NEEDED. PH,LPN

## 2021-05-30 LAB
BH CV ECHO MEAS - ACS: 1.7 CM
BH CV ECHO MEAS - AO MAX PG (FULL): 5.2 MMHG
BH CV ECHO MEAS - AO MAX PG: 6.9 MMHG
BH CV ECHO MEAS - AO MEAN PG (FULL): 3 MMHG
BH CV ECHO MEAS - AO MEAN PG: 4 MMHG
BH CV ECHO MEAS - AO ROOT AREA (BSA CORRECTED): 1.8
BH CV ECHO MEAS - AO ROOT AREA: 7.3 CM^2
BH CV ECHO MEAS - AO ROOT DIAM: 3.1 CM
BH CV ECHO MEAS - AO V2 MAX: 131.3 CM/SEC
BH CV ECHO MEAS - AO V2 MEAN: 95.5 CM/SEC
BH CV ECHO MEAS - AO V2 VTI: 33 CM
BH CV ECHO MEAS - AVA(I,A): 1.7 CM^2
BH CV ECHO MEAS - AVA(I,D): 1.7 CM^2
BH CV ECHO MEAS - AVA(V,A): 1.7 CM^2
BH CV ECHO MEAS - AVA(V,D): 1.7 CM^2
BH CV ECHO MEAS - BSA(HAYCOCK): 1.7 M^2
BH CV ECHO MEAS - BSA: 1.7 M^2
BH CV ECHO MEAS - BZI_BMI: 22 KILOGRAMS/M^2
BH CV ECHO MEAS - BZI_METRIC_HEIGHT: 167.6 CM
BH CV ECHO MEAS - BZI_METRIC_WEIGHT: 61.7 KG
BH CV ECHO MEAS - EDV(CUBED): 224.8 ML
BH CV ECHO MEAS - EDV(MOD-SP4): 112 ML
BH CV ECHO MEAS - EDV(TEICH): 185.5 ML
BH CV ECHO MEAS - EF(CUBED): 47 %
BH CV ECHO MEAS - EF(MOD-SP4): 38.3 %
BH CV ECHO MEAS - EF(TEICH): 38.6 %
BH CV ECHO MEAS - ESV(CUBED): 119.1 ML
BH CV ECHO MEAS - ESV(MOD-SP4): 69.1 ML
BH CV ECHO MEAS - ESV(TEICH): 113.9 ML
BH CV ECHO MEAS - FS: 19.1 %
BH CV ECHO MEAS - IVS/LVPW: 1.2
BH CV ECHO MEAS - IVSD: 1.3 CM
BH CV ECHO MEAS - LA DIMENSION: 3.9 CM
BH CV ECHO MEAS - LA/AO: 1.3
BH CV ECHO MEAS - LV DIASTOLIC VOL/BSA (35-75): 66 ML/M^2
BH CV ECHO MEAS - LV IVRT: 0.1 SEC
BH CV ECHO MEAS - LV MASS(C)D: 326.4 GRAMS
BH CV ECHO MEAS - LV MASS(C)DI: 192.2 GRAMS/M^2
BH CV ECHO MEAS - LV MAX PG: 1.7 MMHG
BH CV ECHO MEAS - LV MEAN PG: 1 MMHG
BH CV ECHO MEAS - LV SYSTOLIC VOL/BSA (12-30): 40.7 ML/M^2
BH CV ECHO MEAS - LV V1 MAX: 64.5 CM/SEC
BH CV ECHO MEAS - LV V1 MEAN: 51.4 CM/SEC
BH CV ECHO MEAS - LV V1 VTI: 15.8 CM
BH CV ECHO MEAS - LVIDD: 6.1 CM
BH CV ECHO MEAS - LVIDS: 4.9 CM
BH CV ECHO MEAS - LVLD AP4: 7.2 CM
BH CV ECHO MEAS - LVLS AP4: 6.8 CM
BH CV ECHO MEAS - LVOT AREA (M): 3.5 CM^2
BH CV ECHO MEAS - LVOT AREA: 3.5 CM^2
BH CV ECHO MEAS - LVOT DIAM: 2.1 CM
BH CV ECHO MEAS - LVPWD: 1.1 CM
BH CV ECHO MEAS - MV A MAX VEL: 101 CM/SEC
BH CV ECHO MEAS - MV DEC SLOPE: 195 CM/SEC^2
BH CV ECHO MEAS - MV E MAX VEL: 65.6 CM/SEC
BH CV ECHO MEAS - MV E/A: 0.65
BH CV ECHO MEAS - RVDD: 2.7 CM
BH CV ECHO MEAS - SI(AO): 142 ML/M^2
BH CV ECHO MEAS - SI(CUBED): 62.2 ML/M^2
BH CV ECHO MEAS - SI(LVOT): 32.2 ML/M^2
BH CV ECHO MEAS - SI(MOD-SP4): 25.3 ML/M^2
BH CV ECHO MEAS - SI(TEICH): 42.2 ML/M^2
BH CV ECHO MEAS - SV(AO): 241.1 ML
BH CV ECHO MEAS - SV(CUBED): 105.7 ML
BH CV ECHO MEAS - SV(LVOT): 54.7 ML
BH CV ECHO MEAS - SV(MOD-SP4): 42.9 ML
BH CV ECHO MEAS - SV(TEICH): 71.6 ML
MAXIMAL PREDICTED HEART RATE: 144 BPM
STRESS TARGET HR: 122 BPM

## 2021-06-02 ENCOUNTER — TELEPHONE (OUTPATIENT)
Dept: CARDIOLOGY | Facility: CLINIC | Age: 76
End: 2021-06-02

## 2021-06-02 NOTE — TELEPHONE ENCOUNTER
ECHO RESULTS BRIEFLY DISCUSSED WITH MR. DOVER AND SOWMYA COSBY SCHEDULING 2-3 WEEK F/U APPT. PH,LPN            ----- Message from Umberto Dovre MD sent at 6/2/2021 12:37 PM EDT -----  2-3 week f/u

## 2021-06-15 ENCOUNTER — PRIOR AUTHORIZATION (OUTPATIENT)
Dept: CARDIOLOGY | Facility: CLINIC | Age: 76
End: 2021-06-15

## 2021-06-15 ENCOUNTER — OFFICE VISIT (OUTPATIENT)
Dept: CARDIOLOGY | Facility: CLINIC | Age: 76
End: 2021-06-15

## 2021-06-15 VITALS
HEART RATE: 66 BPM | WEIGHT: 140.8 LBS | HEIGHT: 66 IN | DIASTOLIC BLOOD PRESSURE: 62 MMHG | SYSTOLIC BLOOD PRESSURE: 128 MMHG | OXYGEN SATURATION: 100 % | BODY MASS INDEX: 22.63 KG/M2

## 2021-06-15 DIAGNOSIS — I25.10 CORONARY ARTERY DISEASE INVOLVING NATIVE CORONARY ARTERY OF NATIVE HEART WITHOUT ANGINA PECTORIS: Primary | ICD-10-CM

## 2021-06-15 DIAGNOSIS — E78.2 MIXED HYPERLIPIDEMIA: ICD-10-CM

## 2021-06-15 DIAGNOSIS — R94.30 CARDIAC LEFT VENTRICULAR EJECTION FRACTION 30-35 PERCENT: ICD-10-CM

## 2021-06-15 DIAGNOSIS — R06.02 SHORTNESS OF BREATH: ICD-10-CM

## 2021-06-15 DIAGNOSIS — Z72.0 TOBACCO USE: ICD-10-CM

## 2021-06-15 DIAGNOSIS — I10 ESSENTIAL HYPERTENSION: ICD-10-CM

## 2021-06-15 PROCEDURE — 99214 OFFICE O/P EST MOD 30 MIN: CPT | Performed by: NURSE PRACTITIONER

## 2021-06-15 RX ORDER — ALBUTEROL SULFATE 1.25 MG/3ML
SOLUTION RESPIRATORY (INHALATION)
COMMUNITY
Start: 2021-05-25

## 2021-06-15 NOTE — PATIENT INSTRUCTIONS
For more information:    Quit Now [place]  1-800-QUIT-NOW  https://[place].quitlogix.org/en-[place]/  Steps to Quit Smoking  Smoking tobacco can be harmful to your health and can affect almost every organ in your body. Smoking puts you, and those around you, at risk for developing many serious chronic diseases. Quitting smoking is difficult, but it is one of the best things that you can do for your health. It is never too late to quit.  What are the benefits of quitting smoking?  When you quit smoking, you lower your risk of developing serious diseases and conditions, such as:  · Lung cancer or lung disease, such as COPD.  · Heart disease.  · Stroke.  · Heart attack.  · Infertility.  · Osteoporosis and bone fractures.  Additionally, symptoms such as coughing, wheezing, and shortness of breath may get better when you quit. You may also find that you get sick less often because your body is stronger at fighting off colds and infections. If you are pregnant, quitting smoking can help to reduce your chances of having a baby of low birth weight.  How do I get ready to quit?  When you decide to quit smoking, create a plan to make sure that you are successful. Before you quit:  · Pick a date to quit. Set a date within the next two weeks to give you time to prepare.  · Write down the reasons why you are quitting. Keep this list in places where you will see it often, such as on your bathroom mirror or in your car or wallet.  · Identify the people, places, things, and activities that make you want to smoke (triggers) and avoid them. Make sure to take these actions:  ¨ Throw away all cigarettes at home, at work, and in your car.  ¨ Throw away smoking accessories, such as ashtrays and lighters.  ¨ Clean your car and make sure to empty the ashtray.  ¨ Clean your home, including curtains and carpets.  · Tell your family, friends, and coworkers that you are quitting. Support from your loved ones can make quitting easier.  · Talk with  your health care provider about your options for quitting smoking.  · Find out what treatment options are covered by your health insurance.  What strategies can I use to quit smoking?  Talk with your healthcare provider about different strategies to quit smoking. Some strategies include:  · Quitting smoking altogether instead of gradually lessening how much you smoke over a period of time. Research shows that quitting “cold turkey” is more successful than gradually quitting.  · Attending in-person counseling to help you build problem-solving skills. You are more likely to have success in quitting if you attend several counseling sessions. Even short sessions of 10 minutes can be effective.  · Finding resources and support systems that can help you to quit smoking and remain smoke-free after you quit. These resources are most helpful when you use them often. They can include:  ¨ Online chats with a counselor.  ¨ Telephone quitlines.  ¨ Printed self-help materials.  ¨ Support groups or group counseling.  ¨ Text messaging programs.  ¨ Mobile phone applications.  · Taking medicines to help you quit smoking. (If you are pregnant or breastfeeding, talk with your health care provider first.) Some medicines contain nicotine and some do not. Both types of medicines help with cravings, but the medicines that include nicotine help to relieve withdrawal symptoms. Your health care provider may recommend:  ¨ Nicotine patches, gum, or lozenges.  ¨ Nicotine inhalers or sprays.  ¨ Non-nicotine medicine that is taken by mouth.  Talk with your health care provider about combining strategies, such as taking medicines while you are also receiving in-person counseling. Using these two strategies together makes you more likely to succeed in quitting than if you used either strategy on its own.  If you are pregnant or breastfeeding, talk with your health care provider about finding counseling or other support strategies to quit smoking. Do  not take medicine to help you quit smoking unless told to do so by your health care provider.  What things can I do to make it easier to quit?  Quitting smoking might feel overwhelming at first, but there is a lot that you can do to make it easier. Take these important actions:  · Reach out to your family and friends and ask that they support and encourage you during this time. Call telephone quitlines, reach out to support groups, or work with a counselor for support.  · Ask people who smoke to avoid smoking around you.  · Avoid places that trigger you to smoke, such as bars, parties, or smoke-break areas at work.  · Spend time around people who do not smoke.  · Lessen stress in your life, because stress can be a smoking trigger for some people. To lessen stress, try:  ¨ Exercising regularly.  ¨ Deep-breathing exercises.  ¨ Yoga.  ¨ Meditating.  ¨ Performing a body scan. This involves closing your eyes, scanning your body from head to toe, and noticing which parts of your body are particularly tense. Purposefully relax the muscles in those areas.  · Download or purchase mobile phone or tablet apps (applications) that can help you stick to your quit plan by providing reminders, tips, and encouragement. There are many free apps, such as QuitGuide from the CDC (Centers for Disease Control and Prevention). You can find other support for quitting smoking (smoking cessation) through smokefree.gov and other websites.  How will I feel when I quit smoking?  Within the first 24 hours of quitting smoking, you may start to feel some withdrawal symptoms. These symptoms are usually most noticeable 2-3 days after quitting, but they usually do not last beyond 2-3 weeks. Changes or symptoms that you might experience include:  · Mood swings.  · Restlessness, anxiety, or irritation.  · Difficulty concentrating.  · Dizziness.  · Strong cravings for sugary foods in addition to nicotine.  · Mild weight  gain.  · Constipation.  · Nausea.  · Coughing or a sore throat.  · Changes in how your medicines work in your body.  · A depressed mood.  · Difficulty sleeping (insomnia).  After the first 2-3 weeks of quitting, you may start to notice more positive results, such as:  · Improved sense of smell and taste.  · Decreased coughing and sore throat.  · Slower heart rate.  · Lower blood pressure.  · Clearer skin.  · The ability to breathe more easily.  · Fewer sick days.  Quitting smoking is very challenging for most people. Do not get discouraged if you are not successful the first time. Some people need to make many attempts to quit before they achieve long-term success. Do your best to stick to your quit plan, and talk with your health care provider if you have any questions or concerns.  This information is not intended to replace advice given to you by your health care provider. Make sure you discuss any questions you have with your health care provider.  Document Released: 12/12/2002 Document Revised: 08/15/2017 Document Reviewed: 05/03/2016  [employer] Interactive Patient Education © 2017 [employer]    Acute Coronary Syndrome  Acute coronary syndrome (ACS) is a serious problem in which there is suddenly not enough blood and oxygen reaching the heart. ACS can result in chest pain or a heart attack.  This condition is a medical emergency. If you have any symptoms of this condition, get help right away.  What are the causes?  This condition may be caused by:  · A buildup of fat and cholesterol inside the arteries (atherosclerosis). This is the most common cause. The buildup (plaque) can cause blood vessels in the heart (coronary arteries) to become narrow or blocked, which reduces blood flow to the heart. Plaque can also break off and lead to a clot, which can block an artery and cause a heart attack or stroke.  · Sudden tightening of the muscles around the coronary arteries (coronary spasm).  · Tearing of a coronary  artery (spontaneous coronary artery dissection).  · Very low blood pressure (hypotension).  · An abnormal heartbeat (arrhythmia).  · Other medical conditions that cause a decrease of oxygen to the heart, such as anemiaorrespiratory failure.  · Using cocaine or methamphetamine.  What increases the risk?  The following factors may make you more likely to develop this condition:  · Age. The risk for ACS increases as you get older.  · History of chest pain, heart attack, peripheral artery disease, or stroke.  · Having taken chemotherapy or immune-suppressing medicines.  · Being male.  · Family history of chest pain, heart disease, or stroke.  · Smoking.  · Not exercising enough.  · Being overweight.  · High cholesterol.  · High blood pressure (hypertension).  · Diabetes.  · Excessive alcohol use.  What are the signs or symptoms?  Common symptoms of this condition include:  · Chest pain. The pain may last a long time, or it may stop and come back (recur). It may feel like:  ? Crushing or squeezing.  ? Tightness, pressure, fullness, or heaviness.  · Arm, neck, jaw, or back pain.  · Heartburn or indigestion.  · Shortness of breath.  · Nausea.  · Sudden cold sweats.  · Light-headedness.  · Dizziness or passing out.  · Tiredness (fatigue).  Sometimes there are no symptoms.  How is this diagnosed?  This condition may be diagnosed based on:  · Your medical history and symptoms.  · Imaging tests, such as:  ? An electrocardiogram (ECG). This measures the heart's electrical activity.  ? X-rays.  ? CT scan.  ? A coronary angiogram. For this test, dye is injected into the heart arteries and then X-rays are taken.  ? Myocardial perfusion imaging. This test shows how well blood flows through your heart muscle.  · Blood tests. These may be repeated at certain time intervals.  · Exercise stress testing.  · Echocardiogram. This is a test that uses sound waves to produce detailed images of the heart.  How is this treated?  Treatment for  this condition may include:  · Oxygen therapy.  · Medicines, such as:  ? Antiplatelet medicines and blood-thinning medicines, such as aspirin. These help prevent blood clots.  ? Medicine that dissolves any blood clots (fibrinolytic therapy).  ? Blood pressure medicines.  ? Nitroglycerin. This helps widen blood vessels to improve blood flow.  ? Pain medicine.  ? Cholesterol-lowering medicine.  · Surgery, such as:  ? Coronary angioplasty with stent placement. This involves placing a small piece of metal that looks like mesh or a spring into a narrow coronary artery. This widens the artery and keeps it open.  ? Coronary artery bypass surgery. This involves taking a section of a blood vessel from a different part of your body and placing it on the blocked coronary artery to allow blood to flow around the blockage.  · Cardiac rehabilitation. This is a program that includes exercise training, education, and counseling to help you recover.  Follow these instructions at home:  Eating and drinking  · Eat a heart-healthy diet that includes whole grains, fruits and vegetables, lean proteins, and low-fat or nonfat dairy products.  · Limit how much salt (sodium) you eat as told by your health care provider. Follow instructions from your health care provider about any other eating or drinking restrictions, such as limiting foods that are high in fat and processed sugars.  · Use healthy cooking methods such as roasting, grilling, broiling, baking, poaching, steaming, or stir-frying.  · Work with a dietitian to follow a heart-healthy eating plan.  Medicines  · Take over-the-counter and prescription medicines only as told by your health care provider.  · Do not take these medicines unless your health care provider approves:  ? Vitamin supplements that contain vitamin A or vitamin E.  ? NSAIDs, such as ibuprofen, naproxen, or celecoxib.  ? Hormone replacement therapy that contains estrogen.  · If you are taking blood  thinners:  ? Talk with your health care provider before you take any medicines that contain aspirin or NSAIDs. These medicines increase your risk for dangerous bleeding.  ? Take your medicine exactly as told, at the same time every day.  ? Avoid activities that could cause injury or bruising, and follow instructions about how to prevent falls.  ? Wear a medical alert bracelet, and carry a card that lists what medicines you take.  Activity  · Follow your cardiac rehabilitation program. Do exercises as told by your physical therapist.  · Ask your health care provider what activities and exercises are safe for you. Follow his or her instructions about lifting, driving, or climbing stairs.  Lifestyle  · Do not use any products that contain nicotine or tobacco, such as cigarettes, e-cigarettes, and chewing tobacco. If you need help quitting, ask your health care provider.  · Do not drink alcohol if your health care provider tells you not to drink.  · If you drink alcohol:  ? Limit how much you have to 0-1 drink a day.  ? Be aware of how much alcohol is in your drink. In the [place] one drink equals one 12 oz bottle of beer (355 mL), one 5 oz glass of wine (148 mL), or one 1½ oz glass of hard liquor (44 mL).  · Maintain a healthy weight. If you need to lose weight, work with your health care provider to do so safely.  General instructions  · Tell all the health care providers who provide care for you about your heart condition, including your dentist. This may affect the medicines or treatment you receive.  · Manage any other health conditions you have, such as hypertension or diabetes. These conditions affect your heart.  · Pay attention to your mental health. You may be at higher risk for depression.  ? Find ways to manage stress.  ? Talk to your health care provider about depression screening and treatment.  · Keep your vaccinations up to date.  ? Get the flu shot (influenza vaccine) every year.  ? Get the pneumococcal  vaccine if you are age 65 or older.  · If directed, monitor your blood pressure at home.  · Keep all follow-up visits as told by your health care provider. This is important.  Contact a health care provider if you:  · Feel overwhelmed or sad.  · Have trouble doing your daily activities.  Get help right away if you:  · Have pain in your chest, neck, arm, jaw, stomach, or back that recurs, and:  ? It lasts for more than a few minutes.  ? It is not relieved by taking the medicineyour health care provider prescribed.  · Have unexplained:  ? Heavy sweating.  ? Heartburn or indigestion.  ? Nausea or vomiting.  ? Shortness of breath.  ? Difficulty breathing.  ? Fatigue.  ? Nervousness or anxiety.  ? Weakness.  ? Diarrhea.  ? Dark stools or blood in your stool.  · Have sudden light-headedness or dizziness.  · Have blood pressure that is higher than 180/120.  · Faint.  · Have thoughts about hurting yourself.  These symptoms may represent a serious problem that is an emergency. Do not wait to see if the symptoms will go away. Get medical help right away. Call your local emergency services (911 in the [place] Do not drive yourself to the hospital.   Summary  · Acute coronary syndrome (ACS) is when there is not enough blood and oxygen being supplied to the heart. ACS can result in chest pain or a heart attack.  · Acute coronary syndrome is a medical emergency. If you have any symptoms of this condition, get help right away.  · Treatment includes medicines and procedures to open the blocked arteries and restore blood flow.  This information is not intended to replace advice given to you by your health care provider. Make sure you discuss any questions you have with your health care provider.  Document Revised: 05/20/2020 Document Reviewed: 12/30/2019  [employer] Patient Education © 2021 [employer]      Heart Failure, Diagnosis    Heart failure means that your heart is not able to pump blood in the right way. This makes it hard  for your body to work well. Heart failure is usually a long-term (chronic) condition. You must take good care of yourself and follow your treatment plan from your doctor.  What are the causes?  This condition may be caused by:  · High blood pressure.  · Build up of cholesterol and fat in the arteries.  · Heart attack. This injures the heart muscle.  · Heart valves that do not open and close properly.  · Damage of the heart muscle. This is also called cardiomyopathy.  · Lung disease.  · Abnormal heart rhythms.  What increases the risk?  The risk of heart failure goes up as a person ages. This condition is also more likely to develop in people who:  · Are overweight.  · Are male.  · Smoke or chew tobacco.  · Abuse alcohol or illegal drugs.  · Have taken medicines that can damage the heart.  · Have diabetes.  · Have abnormal heart rhythms.  · Have thyroid problems.  · Have low blood counts (anemia).  What are the signs or symptoms?  Symptoms of this condition include:  · Shortness of breath.  · Coughing.  · Swelling of the feet, ankles, legs, or belly.  · Losing weight for no reason.  · Trouble breathing.  · Waking from sleep because of the need to sit up and get more air.  · Rapid heartbeat.  · Being very tired.  · Feeling dizzy, or feeling like you may pass out (faint).  · Having no desire to eat.  · Feeling like you may vomit (nauseous).  · Peeing (urinating) more at night.  · Feeling confused.  How is this treated?         This condition may be treated with:  · Medicines. These can be given to treat blood pressure and to make the heart muscles stronger.  · Changes in your daily life. These may include eating a healthy diet, staying at a healthy body weight, quitting tobacco and illegal drug use, or doing exercises.  · Surgery. Surgery can be done to open blocked valves, or to put devices in the heart, such as pacemakers.  · A donor heart (heart transplant). You will receive a healthy heart from a donor.  Follow  these instructions at home:  · Treat other conditions as told by your doctor. These may include high blood pressure, diabetes, thyroid disease, or abnormal heart rhythms.  · Learn as much as you can about heart failure.  · Get support as you need it.  · Keep all follow-up visits as told by your doctor. This is important.  Summary  · Heart failure means that your heart is not able to pump blood in the right way.  · This condition is caused by high blood pressure, heart attack, or damage of the heart muscle.  · Symptoms of this condition include shortness of breath and swelling of the feet, ankles, legs, or belly. You may also feel very tired or feel like you may vomit.  · You may be treated with medicines, surgery, or changes in your daily life.  · Treat other health conditions as told by your doctor.  This information is not intended to replace advice given to you by your health care provider. Make sure you discuss any questions you have with your health care provider.  Document Revised: 03/06/2020 Document Reviewed: 03/06/2020  [employer] Patient Education © 2021 [employer]

## 2021-06-15 NOTE — TELEPHONE ENCOUNTER
Gisella CaseId:[id number]  Status:Approved  Review Type:Prior Auth  Coverage Start Date:05/16/2021  Coverage End Date:12/31/2099  Diana Roa MA

## 2021-06-15 NOTE — PROGRESS NOTES
Subjective     Ronnell Dover is a 76 y.o. male who presents to [facility] for Follow-up, Coronary Artery Disease, Hyperlipidemia, and Hypertension.    CHIEF COMPLIANT  Chief Complaint   Patient presents with   • Follow-up   • Coronary Artery Disease   • Hyperlipidemia   • Hypertension     PROBLEM LIST:      1. Coronary artery disease   1.1 Stenting in circumflex 08/2010 by Dr. MAHONEY and stenting RCA 10/20/2010 Dr. MAHONEY   1.2 Stenting, 1999, inadequate data.  1.3 cardiac catheterization February 2018 demonstrating a chronic total occlusion of the right coronary artery with collateralization noted.  Nonobstructive disease otherwise and medical measures recommended  1.4 echocardiogram 5/21: Akinesis of the inferior basilar and inferior lateral and significant hypokinesis of the diaphragmatic and lateral segments with an estimated ejection fraction of 30 to 35%.  Mild concentric left ventricular hypertrophy grade 1A diastolic dysfunction moderate left atrial enlargement.  There is trivial to mild MR trivial AI trivial TR  2. COPD   3. Daily tobacco habituation; 1/2 PPD   4. Diabetes   5. Essential Hypertension   6. Mixed Hyperlipidemia       Problem List Items Addressed This Visit        Cardiac and Vasculature    Coronary artery disease - Primary    Relevant Medications    sacubitril-valsartan (ENTRESTO) 24-26 MG tablet    Essential hypertension    Mixed hyperlipidemia       Pulmonary and Pneumonias    Shortness of breath       Tobacco    Tobacco use      Other Visit Diagnoses     Cardiac left ventricular ejection fraction 30-35 percent        Relevant Medications    sacubitril-valsartan (ENTRESTO) 24-26 MG tablet      PROBLEM LIST:      1. Coronary artery disease   1.1 Stenting in circumflex 08/2010 by Dr. MAHONEY and stenting RCA 10/20/2010 Dr. MAHONEY   1.2 Stenting, 1999, inadequate data.  1.3 cardiac catheterization February 2018 demonstrating a chronic total occlusion of the right coronary artery with collateralization noted.   Nonobstructive disease otherwise and medical measures recommended  1.  4 echocardiogram 5/21: Near akinesis of the inferior basilar and inferior lateral walls a significant hypokinesis of the diaphragmatic and lateral segments with an ejection fraction of 30 to 35%.  Mild concentric left ventricular hypertrophy grade 1A diastolic dysfunction and moderate left atrial enlargement, trivial to mild MR, trivial AI, trivial TR  2. COPD   3. Daily tobacco habituation; 1/2 PPD   4. Diabetes   5. Essential Hypertension   6. Mixed Hyperlipidemia     HPI  Mr. Dover is a 76-year-old male patient who is being followed up today for coronary artery disease and abnormal echocardiogram.  Patient does have a history of coronary artery disease in which she has had multiple stenting in the past with known chronic total occlusion that is well collateralize that is being medically managed.  We did repeat an echocardiogram that did show that in the area of distribution of the right coronary artery is now significantly hypokinetic to akinetic and the diaphragmatic and lateral segments with an ejection fraction that has reduced to 30 to 35%.  His ejection fraction was previously 45 to 50% in 2018.  I did have an extensive conversation with Mr. Dover in regards for further evaluation of potential reasons for the worsening of his ejection fraction.  I did recommend further evaluation to determine if this is caused by ischemia which is most likely due to his history of the chronic total occlusion of his RCA.  However patient's is relatively asymptomatic.  He does report some chronic shortness of breath is unchanged and nonprogressive.  He reports that he does have a history of COPD and there is been no significant change.  He does have an associated cough with the shortness of breath.  He does report some fatigue where he gets tired easily.  In addition mild lower extremity edema was also reported.  Patient does have chronic arterial  hypertension which seems to be well controlled on his current blood pressure medication regimen.  He is currently on lisinopril, amlodipine, carvedilol, today's blood pressure is 128/62 heart rate of 66.  He denies any chest pain, palpitations, dizziness, lightheadedness, headaches, syncope, PND, orthopnea, or other neurological problems.                      PRIOR MEDS  Current Outpatient Medications on File Prior to Visit   Medication Sig Dispense Refill   • albuterol (ACCUNEB) 1.25 MG/3ML nebulizer solution prn     • albuterol sulfate  (90 Base) MCG/ACT inhaler Inhale 2 puffs Every 4 (Four) Hours As Needed for Wheezing.     • amLODIPine (NORVASC) 2.5 MG tablet Take 1 tablet by mouth Daily. 30 tablet 11   • aspirin 81 MG EC tablet Take 1 tablet by mouth Daily. 30 tablet 11   • atorvastatin (LIPITOR) 40 MG tablet TAKE 1 TABLET DAILY (Patient taking differently: Take 10 mg by mouth Every Night.) 90 tablet 3   • azithromycin (ZITHROMAX) 250 MG tablet Take 250 mg by mouth. Takes M/W/F     • Brovana 15 MCG/2ML nebulizer solution 2 (Two) Times a Day.     • budesonide (PULMICORT) 0.5 MG/2ML nebulizer solution 2 (Two) Times a Day.     • carvedilol (COREG) 6.25 MG tablet Take 2 tablets by mouth 2 (Two) Times a Day With Meals. 180 tablet 3   • clopidogrel (PLAVIX) 75 MG tablet Daily.     • Daliresp 500 MCG tablet tablet Daily.     • metFORMIN (GLUCOPHAGE) 1000 MG tablet Take 1,000 mg by mouth 2 (Two) Times a Day With Meals.     • O2 (OXYGEN) Inhale 3 L/min 1 (One) Time.     • predniSONE (DELTASONE) 5 MG tablet Daily.     • Spiriva Respimat 2.5 MCG/ACT aerosol solution inhaler Daily.     • nitroglycerin (NITROSTAT) 0.4 MG SL tablet Place 1 tablet under the tongue Every 5 (Five) Minutes As Needed for Chest Pain. Take no more than 3 doses in 15 minutes. 25 tablet 3     No current facility-administered medications on file prior to visit.       ALLERGIES  Lasix [furosemide] and Penicillins    HISTORY  Past Medical  "History:   Diagnosis Date   • Angina pectoris (CMS/HCC)    • Basal cell carcinoma of right side of nose    • Cancer (CMS/HCC)     Skin   • CHF (congestive heart failure) (CMS/HCC)    • COPD (chronic obstructive pulmonary disease) (CMS/HCC)    • Coronary artery disease    • Diabetes mellitus (CMS/HCC)    • Hyperlipidemia    • Hypertension    • Skin cancer (melanoma) (CMS/HCC)     spot on ear       Social History     Socioeconomic History   • Marital status:      Spouse name: Not on file   • Number of children: Not on file   • Years of education: Not on file   • Highest education level: Not on file   Tobacco Use   • Smoking status: Current Every Day Smoker     Packs/day: 1.00     Types: Cigarettes   • Smokeless tobacco: Never Used   Substance and Sexual Activity   • Alcohol use: No   • Drug use: No   • Sexual activity: Defer       Family History   Problem Relation Age of Onset   • No Known Problems Mother    • Heart disease Father    • Stroke Brother        Review of Systems   Constitutional: Positive for fatigue.   HENT: Negative.    Eyes: Positive for visual disturbance (wears glasses).   Respiratory: Positive for cough and shortness of breath (HX COPD).    Cardiovascular: Positive for leg swelling (mildly left). Negative for chest pain and palpitations.   Gastrointestinal: Positive for abdominal pain and constipation.   Endocrine: Negative.    Genitourinary: Negative.    Musculoskeletal: Positive for gait problem (in W/C). Negative for arthralgias and myalgias.   Skin: Negative.    Allergic/Immunologic: Negative.    Neurological: Negative for dizziness, syncope and light-headedness.   Hematological: Bruises/bleeds easily.   Psychiatric/Behavioral: Positive for sleep disturbance (up to BR and with breathing issues).       Objective     VITALS: /62 (BP Location: Left arm, Patient Position: Sitting)   Pulse 66   Ht 167.6 cm (65.98\")   Wt 63.9 kg (140 lb 12.8 oz)   SpO2 100%   BMI 22.74 kg/m²     "     LABS:   Lab Results (most recent)     None          IMAGING:   No Images in the past 120 days found..    EXAM:  Physical Exam  Vitals and nursing note reviewed.   Constitutional:       Appearance: He is well-developed.   HENT:      Head: Normocephalic and atraumatic.   Eyes:      Pupils: Pupils are equal, round, and reactive to light.   Neck:      Vascular: No carotid bruit or JVD.   Cardiovascular:      Rate and Rhythm: Normal rate and regular rhythm.      Pulses:           Carotid pulses are 2+ on the right side and 2+ on the left side.       Radial pulses are 2+ on the right side and 2+ on the left side.        Posterior tibial pulses are 2+ on the right side and 2+ on the left side.      Heart sounds: Murmur heard.   No gallop.    Pulmonary:      Effort: Pulmonary effort is normal. No respiratory distress.      Breath sounds: Wheezing and rhonchi present.   Abdominal:      General: Bowel sounds are normal. There is no distension.      Palpations: Abdomen is soft.      Tenderness: There is no abdominal tenderness.   Musculoskeletal:         General: Swelling (trace trace) present. Normal range of motion.      Cervical back: Neck supple.   Skin:     General: Skin is warm and dry.   Neurological:      Mental Status: He is alert and oriented to person, place, and time.      Cranial Nerves: No cranial nerve deficit.      Sensory: No sensory deficit.   Psychiatric:         Speech: Speech normal.         Behavior: Behavior normal.         Thought Content: Thought content normal.         Judgment: Judgment normal.         Procedure   Procedures       Assessment/Plan    Diagnosis Plan   1. Coronary artery disease involving native coronary artery of native heart without angina pectoris     2. Essential hypertension     3. Mixed hyperlipidemia     4. Shortness of breath     5. Tobacco use     6. Cardiac left ventricular ejection fraction 30-35 percent  sacubitril-valsartan (ENTRESTO) 24-26 MG tablet   1.  Patient does  have a history of known coronary artery disease which he has history of stenting and known total occlusion of the RCA.  We did follow patient back up today due to abnormal echocardiogram that did show a reduced ejection fraction at 30 to 35%.  As well as akinesis to hypokinesis in the area that would be perfused by the right coronary artery.  We had an extensive conversation in regards to potential for further evaluation and ischemic work-up.  Patient wishes to defer stress testing at this time.  2.  Patient's blood pressure is well controlled on current blood pressure medication regimen.  No medication changes are warranted at this time.  Patient advised to monitor blood pressure on a daily basis and report any persistent highs or lows.  Set goal blood pressure for patient at 130/80 or below.  3.  Patient does have chronic shortness of breath is unchanged nonprogressive.  He relates his shortness of breath to his history of COPD.  4.  Due to patient's ischemic cardiomyopathy I do feel it is appropriate to switch his lisinopril to Entresto to see if we can move increase his quality of life.  Patient was advised to hold his lisinopril for 3 days then start the Entresto.  He will be on 24-26 mg of Entresto twice daily.  5.  Informed of signs and symptoms of ACS and advised to seek emergent treatment for any new worsening symptoms.  Patient also advised sooner follow-up as needed.  Also advised to follow-up with family doctor as needed  This note is dictated utilizing voice recognition software.  Although this record has been proof read, transcriptional errors may still be present. If questions occur regarding the content of this record please do not hesitate to call our office.  I have reviewed and confirmed the accuracy of the ROS as documented by the MA/LPN/RN SUSAN Xie    Return in about 3 months (around 9/15/2021), or if symptoms worsen or fail to improve, for 2 week nurse visit.    Diagnoses and all  orders for this visit:    1. Coronary artery disease involving native coronary artery of native heart without angina pectoris (Primary)    2. Essential hypertension    3. Mixed hyperlipidemia    4. Shortness of breath    5. Tobacco use    6. Cardiac left ventricular ejection fraction 30-35 percent  -     sacubitril-valsartan (ENTRESTO) 24-26 MG tablet; Take 1 tablet by mouth 2 (Two) Times a Day.  Dispense: 60 tablet; Refill: 0        Ronnell Dover  reports that he has been smoking cigarettes. He has been smoking about 1.00 pack per day. He has never used smokeless tobacco.. I have educated him on the risk of diseases from using tobacco products such as cancer, COPD and heart disease.     I advised him to quit and he is willing to quit. We have discussed the following method/s for tobacco cessation:  Education Material.  Together we have set a quit date for by next appt. .  He will follow up with me in as scheduled  or sooner to check on his progress.    I spent 3  minutes counseling the patient.     Advance Care Planning   ACP discussion was held with the patient during this visit. Patient does not have an advance directive, declines further assistance.      Patient's Body mass index is 22.74 kg/m². indicating that he is within normal range (BMI 18.5-24.9). No BMI management plan needed..           MEDS ORDERED DURING VISIT:  New Medications Ordered This Visit   Medications   • sacubitril-valsartan (ENTRESTO) 24-26 MG tablet     Sig: Take 1 tablet by mouth 2 (Two) Times a Day.     Dispense:  60 tablet     Refill:  0           This document has been electronically signed by Aaron Burgos Jr., APRN  June 18, 2021 00:20 EDT

## 2021-07-01 ENCOUNTER — TELEPHONE (OUTPATIENT)
Dept: CARDIOLOGY | Facility: CLINIC | Age: 76
End: 2021-07-01

## 2021-07-06 ENCOUNTER — TELEPHONE (OUTPATIENT)
Dept: CARDIOLOGY | Facility: CLINIC | Age: 76
End: 2021-07-06

## 2021-07-06 DIAGNOSIS — R94.30 CARDIAC LEFT VENTRICULAR EJECTION FRACTION 30-35 PERCENT: ICD-10-CM

## 2021-07-06 DIAGNOSIS — I50.9 ACUTE CONGESTIVE HEART FAILURE, UNSPECIFIED HEART FAILURE TYPE (HCC): ICD-10-CM

## 2021-07-06 DIAGNOSIS — I10 ESSENTIAL HYPERTENSION: Primary | ICD-10-CM

## 2021-07-06 NOTE — TELEPHONE ENCOUNTER
"Pt LVM stating his PCP took him off of Amlodipine due to \"extreme constipation\" and that he needs alt to prevent HTN.   (added rx to intolerance list)   "

## 2021-07-07 NOTE — TELEPHONE ENCOUNTER
Patient informed of recommendations. He will  Entresto and monitor blood pressure. He will call with any concerns. SALLY Hall William, APRN [place], [place], MA  Caller: Unspecified (Yesterday, 10:40 AM)  I would like to increase patient's Entresto to help cover his blood pressure due to the fact of discontinuation of his amlodipine.  Please have him monitor his blood pressure over the next 2 weeks particularly and report any significant highs or lows.

## 2021-07-13 ENCOUNTER — TELEPHONE (OUTPATIENT)
Dept: CARDIOLOGY | Facility: CLINIC | Age: 76
End: 2021-07-13

## 2021-07-13 NOTE — TELEPHONE ENCOUNTER
CARDIAC CLEARANCE RECEIVED THIS DATE, SCANNED, AND PLACED IN CLINICAL STAFF'S MAILBOX.    [facility]  DR. WILLIAM HIDALGO

## 2021-07-15 NOTE — TELEPHONE ENCOUNTER
Patient cleared. Increased risk due to EF 30-35%  OK to hold Plavix 5 days. Restart ASAP.     Faxed to [phone]

## 2021-08-05 DIAGNOSIS — R06.02 SHORTNESS OF BREATH: ICD-10-CM

## 2021-08-05 DIAGNOSIS — I10 ESSENTIAL HYPERTENSION: Primary | ICD-10-CM

## 2021-08-05 DIAGNOSIS — R60.0 EDEMA LEG: ICD-10-CM

## 2021-08-05 RX ORDER — FUROSEMIDE 20 MG/1
20 TABLET ORAL DAILY
Qty: 30 TABLET | Refills: 2 | Status: SHIPPED | OUTPATIENT
Start: 2021-08-05 | End: 2022-07-26 | Stop reason: SDUPTHER

## 2021-08-05 NOTE — TELEPHONE ENCOUNTER
Pt contacts office with report of worsened edema in LLE.  States his Entresto was increased to 49-51.  Relates swelling to Entresto medication.  Reports SOB.  R/t COPD.  Uses home oxygen 3L/hr/nc.  States he was seen at the [place] ER on 7/30 due to exacerbation of COPD.  Was given high flow oxygen.  Hes not sure if his swelling complications is associated to his COPD vs. Entresto.     Discussed w/ Rashaun Alfaro PA-C.  Recommendations include:  Complications not caused by Entresto.  We will start Lasix 20 mg daily.  Pt to get a BMP in one week.  Pt made aware.  Verbalizes understanding.  Medication sent to [facility] in [place] per pt request.

## 2021-09-14 ENCOUNTER — TELEPHONE (OUTPATIENT)
Dept: CARDIOLOGY | Facility: CLINIC | Age: 76
End: 2021-09-14

## 2021-09-14 DIAGNOSIS — I10 ESSENTIAL HYPERTENSION: ICD-10-CM

## 2021-09-14 DIAGNOSIS — R94.30 CARDIAC LEFT VENTRICULAR EJECTION FRACTION 30-35 PERCENT: ICD-10-CM

## 2021-09-14 DIAGNOSIS — I50.9 ACUTE CONGESTIVE HEART FAILURE, UNSPECIFIED HEART FAILURE TYPE (HCC): ICD-10-CM

## 2021-09-14 NOTE — TELEPHONE ENCOUNTER
Pt LVM on the triage line requesting a 90 day supply of Entresto 49/51 to be sent to [facility].    Pt notified this will be sent today.

## 2021-12-21 ENCOUNTER — TELEPHONE (OUTPATIENT)
Dept: CARDIOLOGY | Facility: CLINIC | Age: 76
End: 2021-12-21

## 2021-12-21 NOTE — TELEPHONE ENCOUNTER
Patient wants to know if it is safe for him to take Nitro with his current meds and with him having heart failure.    Reviewed with Diana  Nitro was prescribed  With knowledge of heart conditions and meds on chart. Ok for patient to take .    Called patient and notified.    FABY SHANNON

## 2022-04-08 ENCOUNTER — TELEPHONE (OUTPATIENT)
Dept: CARDIOLOGY | Facility: CLINIC | Age: 77
End: 2022-04-08

## 2022-04-08 NOTE — TELEPHONE ENCOUNTER
Per      Carvedilol can cause effect due to Bronchospasm, but due to stage 4 COPD, does not think this is the cause.    In the  monitor sx, follow up w / pulmonology, keep follow up shobha.     Pt verbally understands.

## 2022-04-08 NOTE — TELEPHONE ENCOUNTER
Pt called and states he has COPD stage 4 and in the last x 2 weeks, has a lot of URI going on and cant keep 02 levels where they should be. States just got out of ER at [facility] and was in there x 3 days. He states he is home now but concerned that some of his medications on his list could be causing some of his worsening sx causing his COPD to be worst.     02 97 04/08/22 and states drops to 88 so he does not do a lot moving around.    PT has been notified,new or worsening sx go to ER.

## 2022-07-26 ENCOUNTER — OFFICE VISIT (OUTPATIENT)
Dept: CARDIOLOGY | Facility: CLINIC | Age: 77
End: 2022-07-26

## 2022-07-26 VITALS
HEIGHT: 66 IN | HEART RATE: 76 BPM | BODY MASS INDEX: 24.91 KG/M2 | DIASTOLIC BLOOD PRESSURE: 64 MMHG | WEIGHT: 155 LBS | SYSTOLIC BLOOD PRESSURE: 131 MMHG

## 2022-07-26 DIAGNOSIS — I25.10 CORONARY ARTERY DISEASE INVOLVING NATIVE CORONARY ARTERY OF NATIVE HEART WITHOUT ANGINA PECTORIS: ICD-10-CM

## 2022-07-26 DIAGNOSIS — R60.0 EDEMA LEG: ICD-10-CM

## 2022-07-26 DIAGNOSIS — R06.02 SHORTNESS OF BREATH: ICD-10-CM

## 2022-07-26 DIAGNOSIS — I25.5 ISCHEMIC CARDIOMYOPATHY: Primary | ICD-10-CM

## 2022-07-26 DIAGNOSIS — I50.23 ACUTE ON CHRONIC SYSTOLIC CHF (CONGESTIVE HEART FAILURE): ICD-10-CM

## 2022-07-26 DIAGNOSIS — I10 ESSENTIAL HYPERTENSION: ICD-10-CM

## 2022-07-26 PROCEDURE — 93000 ELECTROCARDIOGRAM COMPLETE: CPT | Performed by: PHYSICIAN ASSISTANT

## 2022-07-26 PROCEDURE — 99214 OFFICE O/P EST MOD 30 MIN: CPT | Performed by: PHYSICIAN ASSISTANT

## 2022-07-26 RX ORDER — PANTOPRAZOLE SODIUM 40 MG/1
40 TABLET, DELAYED RELEASE ORAL DAILY
COMMUNITY

## 2022-07-26 RX ORDER — FUROSEMIDE 40 MG/1
40 TABLET ORAL DAILY
Qty: 30 TABLET | Refills: 5 | Status: SHIPPED | OUTPATIENT
Start: 2022-07-26

## 2022-07-26 RX ORDER — ATORVASTATIN CALCIUM 10 MG/1
10 TABLET, FILM COATED ORAL DAILY
COMMUNITY

## 2022-07-26 RX ORDER — POTASSIUM CHLORIDE 750 MG/1
10 TABLET, FILM COATED, EXTENDED RELEASE ORAL DAILY
Qty: 30 TABLET | Refills: 11 | Status: SHIPPED | OUTPATIENT
Start: 2022-07-26

## 2022-07-26 NOTE — PROGRESS NOTES
Problem list     Subjective   Ronnell Dover is a 77 y.o. male     Chief Complaint   Patient presents with   • Coronary Artery Disease   • Cardiac left ventricular ejection fraction 30-35 percent   PROBLEM LIST:      1. Coronary artery disease   1.1 Stenting in circumflex 08/2010 by Dr. MAHONEY and stenting RCA 10/20/2010 Dr. MAHONEY   1.2 Stenting, 1999, inadequate data.  1.3 cardiac catheterization February 2018 demonstrating a chronic total occlusion of the right coronary artery with collateralization noted.  Nonobstructive disease otherwise and medical measures recommended  1.4 echocardiogram 5/21: Akinesis of the inferior basilar and inferior lateral and significant hypokinesis of the diaphragmatic and lateral segments with an estimated ejection fraction of 30 to 35%.  Mild concentric left ventricular hypertrophy grade 1A diastolic dysfunction moderate left atrial enlargement.  There is trivial to mild MR trivial AI trivial TR  2.  Ischemic cardiomyopathy  2.1 EF by echocardiogram May 2022 estimated at 30 to 35%  3.  Chronic systolic heart failure  4. Diabetes   5. Essential Hypertension   6. Mixed Hyperlipidemia   7.  COPD       HPI    Patient is a 77-year-old male that presents back to the office for routine cardiac follow-up.  As above, patient has history of extensive coronary disease and most recent catheterization in 2018 suggested a chronic total occlusion of the RCA that has been treated medically due to collateral flow and absence of significant symptoms.    Patient recently had a drop in systolic function to 30 to 35% which is significant.  He has no angina but does have significant shortness of breath.  However, this is complicated by the fact that he has severe lung disease.  He follows with pulmonology and is on oxygen continuously.    He has been experiencing orthopnea.  He describes difficulty breathing lying flat and has had edema    He has lower extremity edema seems to be the left lower extremity.  He  has very minimal to trace lower extremity edema on the right but 1-2+ edema on the left with discoloration noted.    He does not palpitate or have dysrhythmic symptoms otherwise.  He is stable        Current Outpatient Medications on File Prior to Visit   Medication Sig Dispense Refill   • albuterol (ACCUNEB) 1.25 MG/3ML nebulizer solution prn     • albuterol sulfate  (90 Base) MCG/ACT inhaler Inhale 2 puffs Every 4 (Four) Hours As Needed for Wheezing.     • aspirin 81 MG EC tablet Take 1 tablet by mouth Daily. 30 tablet 11   • atorvastatin (LIPITOR) 10 MG tablet Take 10 mg by mouth Daily.     • azithromycin (ZITHROMAX) 250 MG tablet Take 250 mg by mouth. Takes M/W/F     • Brovana 15 MCG/2ML nebulizer solution 2 (Two) Times a Day.     • budesonide (PULMICORT) 0.5 MG/2ML nebulizer solution 2 (Two) Times a Day.     • carvedilol (COREG) 6.25 MG tablet Take 2 tablets by mouth 2 (Two) Times a Day With Meals. 180 tablet 3   • clopidogrel (PLAVIX) 75 MG tablet Daily.     • Daliresp 500 MCG tablet tablet Daily.     • metFORMIN (GLUCOPHAGE) 1000 MG tablet Take 1,000 mg by mouth 2 (Two) Times a Day With Meals.     • nitroglycerin (NITROSTAT) 0.4 MG SL tablet Place 1 tablet under the tongue Every 5 (Five) Minutes As Needed for Chest Pain. Take no more than 3 doses in 15 minutes. 25 tablet 3   • nystatin (MYCOSTATIN) 100,000 unit/mL suspension SWISH AND SWALLOW 10 MILLILITERS BY MOUTH EVERY 8 HOURS AS NEEDED     • O2 (OXYGEN) Inhale 3 L/min 1 (One) Time.     • pantoprazole (PROTONIX) 40 MG EC tablet Take 40 mg by mouth Daily.     • predniSONE (DELTASONE) 5 MG tablet Daily.     • sacubitril-valsartan (ENTRESTO) 49-51 MG tablet Take 1 tablet by mouth 2 (Two) Times a Day. 180 tablet 3   • Spiriva Respimat 2.5 MCG/ACT aerosol solution inhaler Daily.     • [DISCONTINUED] furosemide (LASIX) 20 MG tablet Take 1 tablet by mouth Daily. 30 tablet 2   • [DISCONTINUED] atorvastatin (LIPITOR) 40 MG tablet TAKE 1 TABLET DAILY (Patient  taking differently: Take 10 mg by mouth Every Night.) 90 tablet 3     No current facility-administered medications on file prior to visit.       Penicillins, Amlodipine, and Lasix [furosemide]    Past Medical History:   Diagnosis Date   • Angina pectoris (HCC)    • Basal cell carcinoma of right side of nose    • Cancer (HCC)     Skin   • CHF (congestive heart failure) (HCC)    • COPD (chronic obstructive pulmonary disease) (HCC)    • Coronary artery disease    • Diabetes mellitus (HCC)    • Hyperlipidemia    • Hypertension    • Skin cancer (melanoma) (HCC)     spot on ear       Social History     Socioeconomic History   • Marital status:    Tobacco Use   • Smoking status: Current Every Day Smoker     Packs/day: 1.00     Types: Cigarettes   • Smokeless tobacco: Never Used   Substance and Sexual Activity   • Alcohol use: No   • Drug use: No   • Sexual activity: Defer       Family History   Problem Relation Age of Onset   • No Known Problems Mother    • Heart disease Father    • Stroke Brother        Review of Systems   Constitutional: Negative.  Negative for chills and fever.   HENT: Positive for congestion (chest congestion). Negative for sinus pressure.    Respiratory: Positive for shortness of breath (oxygen). Negative for chest tightness.    Cardiovascular: Positive for leg swelling. Negative for chest pain and palpitations.   Gastrointestinal: Negative.  Negative for blood in stool.   Endocrine: Negative.  Negative for cold intolerance and heat intolerance.   Genitourinary: Negative.  Negative for hematuria.   Musculoskeletal: Positive for gait problem.   Neurological: Negative for dizziness, syncope and light-headedness.   Hematological: Bruises/bleeds easily.   Psychiatric/Behavioral: Positive for sleep disturbance (oxygen wakes with soa).       Objective   Vitals:    07/26/22 1502   BP: 131/64   BP Location: Left arm   Patient Position: Sitting   Pulse: 76   Weight: 70.3 kg (155 lb)   Height: 167.6 cm  "(66\")      /64 (BP Location: Left arm, Patient Position: Sitting)   Pulse 76   Ht 167.6 cm (66\")   Wt 70.3 kg (155 lb)   BMI 25.02 kg/m²     Lab Results (most recent)     None          Physical Exam  Vitals and nursing note reviewed.   Constitutional:       General: He is not in acute distress.     Appearance: Normal appearance. He is well-developed.   HENT:      Head: Normocephalic and atraumatic.   Eyes:      General: No scleral icterus.        Right eye: No discharge.         Left eye: No discharge.      Conjunctiva/sclera: Conjunctivae normal.   Neck:      Vascular: No carotid bruit.   Cardiovascular:      Rate and Rhythm: Normal rate and regular rhythm.      Heart sounds: Normal heart sounds. No murmur heard.    No friction rub. No gallop.   Pulmonary:      Effort: Pulmonary effort is normal. No respiratory distress.      Breath sounds: Normal breath sounds. No wheezing or rales.   Chest:      Chest wall: No tenderness.   Musculoskeletal:      Right lower leg: No edema.      Left lower leg: Edema present.   Skin:     General: Skin is warm and dry.      Coloration: Skin is not pale.      Findings: No erythema or rash.   Neurological:      Mental Status: He is alert and oriented to person, place, and time.      Cranial Nerves: No cranial nerve deficit.   Psychiatric:         Behavior: Behavior normal.         Procedure     ECG 12 Lead    Date/Time: 7/26/2022 3:08 PM  Performed by: Rashaun Alfaro PA  Authorized by: Rashaun Alfaro PA   Comparison: compared with previous ECG from 7/30/2021  Comments: EKG demonstrates sinus rhythm at 73 bpm with left bundle branch block, right axis deviation nonspecific ST abnormality at baseline               Assessment & Plan     Problems Addressed this Visit        Cardiac and Vasculature    Coronary artery disease    Relevant Orders    D-dimer, Quantitative    Basic Metabolic Panel    proBNP    Essential hypertension    Relevant Medications    furosemide (LASIX) " 40 MG tablet    Other Relevant Orders    D-dimer, Quantitative    Basic Metabolic Panel    proBNP       Pulmonary and Pneumonias    Shortness of breath    Relevant Medications    furosemide (LASIX) 40 MG tablet    Other Relevant Orders    D-dimer, Quantitative    Basic Metabolic Panel    proBNP    Duplex Venous Lower Extremity - Left CAR      Other Visit Diagnoses     Ischemic cardiomyopathy    -  Primary    Relevant Orders    D-dimer, Quantitative    Basic Metabolic Panel    proBNP    Duplex Venous Lower Extremity - Left CAR    Edema leg        Relevant Medications    furosemide (LASIX) 40 MG tablet    Other Relevant Orders    D-dimer, Quantitative    Basic Metabolic Panel    proBNP    Duplex Venous Lower Extremity - Left CAR    Acute on chronic systolic CHF (congestive heart failure) ([facility])        Relevant Orders    D-dimer, Quantitative    Basic Metabolic Panel    proBNP    Duplex Venous Lower Extremity - Left CAR      Diagnoses       Codes Comments    Ischemic cardiomyopathy    -  Primary ICD-10-CM: I25.5  ICD-9-CM: 414.8     Essential hypertension     ICD-10-CM: I10  ICD-9-CM: 401.9     Shortness of breath     ICD-10-CM: R06.02  ICD-9-CM: 786.05     Edema leg     ICD-10-CM: R60.0  ICD-9-CM: 782.3     Acute on chronic systolic CHF (congestive heart failure) (HCC)     ICD-10-CM: I50.23  ICD-9-CM: 428.23, 428.0     Coronary artery disease involving native coronary artery of native heart without angina pectoris     ICD-10-CM: I25.10  ICD-9-CM: 414.01           Recommendation  1.  Patient is a 77-year-old male with history of coronary disease and total occlusion of the RCA with ischemic cardiomyopathy demonstrated on recent evaluation of systolic function by echo with an estimated EF of 30 to 35%.    2.  We discussed congestive heart failure including sodium restriction and daily weight monitoring.  I feel based on his symptoms, he needs increase in diuretic therapy and I am increasing Lasix to 40 mg with potassium  supplement.  We want to check labs in 1 week to ensure no azotemia or electrolyte abnormality.    3.  Patient has significant lower extremity edema on the left and hardly any or minimal on the right.  I would like to schedule venous ultrasound of the left lower extremity to exclude the possibility of DVT due to unilateral edema.    4.  With patient's congestive heart failure, I would like to add Jardiance to his regimen to help reduce hospitalization in the setting of heart failure.    5.  We had discussion about systolic dysfunction and being at 30 to 35% and the risk of sudden death.  Despite this  and patient's instruction, he does not want any consideration for an AICD.  Therefore, we will abide by his wishes and try to treat medically.  In the future, we would like to consider catheterization if patient would be willing to look for ischemia as a contributing factor of patient's LV dysfunction.  For now, I feel that he needs improvement of overall clinical status before that can be done as he has significant PND and orthopnea with shortness of breath.  We would like to follow him back up closely    6.  At this point, I feel the best thing to do is to optimize patient's medications to hopefully improve symptoms.  We are doing that now and hopefully can increase Entresto or carvedilol in the future pending patient's vitals    7.  We will see him back for follow-up on the above.  For worsening symptoms, recommend him calling the office.  Follow-up with primary as scheduled           Ronnell Dover  reports that he has been smoking cigarettes. He has been smoking about 1.00 pack per day. He has never used smokeless tobacco.. I have educated him on the risk of diseases from using tobacco products such as cancer, COPD and heart disease.     I advised him to quit and he is not willing to quit.    I spent 3  minutes counseling the patient.          Patient brought in medicine list to appointment, it's been  reviewed with patient and med list was updated in the chart.     Advance Care Planning   ACP discussion was held with the patient during this visit. Patient has an advance directive (not in EMR), copy requested.       Electronically signed by:

## 2022-07-27 ENCOUNTER — TELEPHONE (OUTPATIENT)
Dept: CARDIOLOGY | Facility: CLINIC | Age: 77
End: 2022-07-27

## 2022-07-27 NOTE — TELEPHONE ENCOUNTER
Pt called and said that when he picked up his Potassium that it said he shouldn't take it if he is taking another medication, which is in his inhaler. Please advise on what to do.  Sharona Espinoza Rep

## 2022-07-28 ENCOUNTER — TELEPHONE (OUTPATIENT)
Dept: CARDIOLOGY | Facility: CLINIC | Age: 77
End: 2022-07-28

## 2022-07-28 NOTE — TELEPHONE ENCOUNTER
Patient informed of duplex venous results. Patient verbalized understanding. Tamiko HAWK    ----- Message from EMANUEL Xiao sent at 7/28/2022  9:58 AM EDT -----  Let patient know that he is negative for DVT

## 2022-08-03 ENCOUNTER — TELEPHONE (OUTPATIENT)
Dept: CARDIOLOGY | Facility: CLINIC | Age: 77
End: 2022-08-03

## 2022-08-09 ENCOUNTER — TELEPHONE (OUTPATIENT)
Dept: CARDIOLOGY | Facility: CLINIC | Age: 77
End: 2022-08-09

## 2022-08-09 NOTE — TELEPHONE ENCOUNTER
LABS  Called pt to notify of no acute findings on testing, no answer lvm.  ----- Message from Tamiko Burton LPN sent at 8/9/2022  1:48 PM EDT -----    ----- Message -----  From: Rashaun Alfaro PA  Sent: 8/9/2022  10:18 AM EDT  To: Tamiko Burton LPN    Let patient know labs are normal

## 2022-08-23 DIAGNOSIS — I50.9 ACUTE CONGESTIVE HEART FAILURE, UNSPECIFIED HEART FAILURE TYPE: ICD-10-CM

## 2022-08-23 DIAGNOSIS — I10 ESSENTIAL HYPERTENSION: ICD-10-CM

## 2022-08-23 DIAGNOSIS — R94.30 CARDIAC LEFT VENTRICULAR EJECTION FRACTION 30-35 PERCENT: ICD-10-CM

## 2022-08-23 RX ORDER — SACUBITRIL AND VALSARTAN 49; 51 MG/1; MG/1
TABLET, FILM COATED ORAL
Qty: 180 TABLET | Refills: 3 | Status: SHIPPED | OUTPATIENT
Start: 2022-08-23

## 2022-11-15 ENCOUNTER — TELEPHONE (OUTPATIENT)
Dept: CARDIOLOGY | Facility: CLINIC | Age: 77
End: 2022-11-15

## 2022-11-15 NOTE — TELEPHONE ENCOUNTER
Received cardiac clearance request from  stating pt has cataract surgery scheduled for 12/07/2022 and is requiring a cardiac clearance. Placed cardiac clearance request in Tamiko's inbox to review and address with provider.

## 2023-08-18 DIAGNOSIS — I10 ESSENTIAL HYPERTENSION: ICD-10-CM

## 2023-08-18 DIAGNOSIS — R94.30 CARDIAC LEFT VENTRICULAR EJECTION FRACTION 30-35 PERCENT: ICD-10-CM

## 2023-08-18 DIAGNOSIS — I50.9 ACUTE CONGESTIVE HEART FAILURE, UNSPECIFIED HEART FAILURE TYPE: ICD-10-CM

## 2023-08-18 RX ORDER — SACUBITRIL AND VALSARTAN 49; 51 MG/1; MG/1
TABLET, FILM COATED ORAL
Qty: 90 TABLET | Refills: 0 | Status: SHIPPED | OUTPATIENT
Start: 2023-08-18

## 2023-08-22 ENCOUNTER — TELEPHONE (OUTPATIENT)
Dept: CARDIOLOGY | Facility: CLINIC | Age: 78
End: 2023-08-22
Payer: MEDICARE

## 2023-08-22 ENCOUNTER — TELEPHONE (OUTPATIENT)
Dept: CARDIOLOGY | Facility: CLINIC | Age: 78
End: 2023-08-22

## 2023-08-22 DIAGNOSIS — I10 ESSENTIAL HYPERTENSION: ICD-10-CM

## 2023-08-22 DIAGNOSIS — R94.30 CARDIAC LEFT VENTRICULAR EJECTION FRACTION 30-35 PERCENT: ICD-10-CM

## 2023-08-22 DIAGNOSIS — I50.9 ACUTE CONGESTIVE HEART FAILURE, UNSPECIFIED HEART FAILURE TYPE: ICD-10-CM

## 2023-08-22 RX ORDER — SACUBITRIL AND VALSARTAN 49; 51 MG/1; MG/1
1 TABLET, FILM COATED ORAL 2 TIMES DAILY
Qty: 180 TABLET | Refills: 0 | Status: SHIPPED | OUTPATIENT
Start: 2023-08-22

## 2023-08-22 NOTE — TELEPHONE ENCOUNTER
Caller: Ronnell Dover    Relationship to patient: Self    Best call back number: [phone]    Chief complaint: NEEDS TO FOLLOW UP ON HIS MEDICATION. NOTHING REALLY WRONG.     Type of visit: MED FU    Requested date: ASAP. AFTER LUNCH TIME AFTER 1PM

## 2023-08-22 NOTE — TELEPHONE ENCOUNTER
HUB to read...    Left detailed message for patient that I can send 3 month supply to [facility]. Insurance requires yearly follow ups for refills. He will need to call to schedule appointment for future refills.

## 2023-08-22 NOTE — TELEPHONE ENCOUNTER
Caller: Ronnell Dover    Relationship: Self    Best call back number: [phone]     What medications are you currently taking:   Current Outpatient Medications on File Prior to Visit   Medication Sig Dispense Refill    albuterol (ACCUNEB) 1.25 MG/3ML nebulizer solution prn      albuterol sulfate  (90 Base) MCG/ACT inhaler Inhale 2 puffs Every 4 (Four) Hours As Needed for Wheezing.      aspirin 81 MG EC tablet Take 1 tablet by mouth Daily. 30 tablet 11    atorvastatin (LIPITOR) 10 MG tablet Take 10 mg by mouth Daily.      azithromycin (ZITHROMAX) 250 MG tablet Take 250 mg by mouth. Takes M/W/F      Brovana 15 MCG/2ML nebulizer solution 2 (Two) Times a Day.      budesonide (PULMICORT) 0.5 MG/2ML nebulizer solution 2 (Two) Times a Day.      carvedilol (COREG) 6.25 MG tablet Take 2 tablets by mouth 2 (Two) Times a Day With Meals. 180 tablet 3    clopidogrel (PLAVIX) 75 MG tablet Daily.      Daliresp 500 MCG tablet tablet Daily.      empagliflozin (Jardiance) 10 MG tablet tablet Take 1 tablet by mouth Daily. 30 tablet 5    furosemide (LASIX) 40 MG tablet Take 1 tablet by mouth Daily. 30 tablet 5    metFORMIN (GLUCOPHAGE) 1000 MG tablet Take 1,000 mg by mouth 2 (Two) Times a Day With Meals.      nitroglycerin (NITROSTAT) 0.4 MG SL tablet Place 1 tablet under the tongue Every 5 (Five) Minutes As Needed for Chest Pain. Take no more than 3 doses in 15 minutes. 25 tablet 3    nystatin (MYCOSTATIN) 100,000 unit/mL suspension SWISH AND SWALLOW 10 MILLILITERS BY MOUTH EVERY 8 HOURS AS NEEDED      O2 (OXYGEN) Inhale 3 L/min 1 (One) Time.      pantoprazole (PROTONIX) 40 MG EC tablet Take 40 mg by mouth Daily.      potassium chloride 10 MEQ CR tablet Take 1 tablet by mouth Daily. 30 tablet 11    predniSONE (DELTASONE) 5 MG tablet Daily.      sacubitril-valsartan (Entresto) 49-51 MG tablet TAKE 1 TABLET TWICE A DAY 90 tablet 0    Spiriva Respimat 2.5 MCG/ACT aerosol solution inhaler Daily.       No current  facility-administered medications on file prior to visit.      Which medication are you concerned about: ENTRESTO     Who prescribed you this medication: EMANUEL JAIME     What are your concerns: [facility] CALLED PT AND TOLD HIM THAT OUR OFFICE DID NOT WANT TO REFILL THE ENTRESTO. THEY REFILL 90 DAYS AND OUR OFFICE WANTS TO FILLL A 45 DAY SUPPLY BECAUSE HE HAS NOT BEEN IN OFFICE FOR SO LONG. HE IS NOT ABLE TO COME OUT OF THE HOUSE FOR APPTS BECAUSE HE HAS COPD. PLEASE ADVISE ON WHAT TO DO IN THIS SITUATION. HAS HALF A BOTTLE LEFT.

## 2023-08-23 NOTE — TELEPHONE ENCOUNTER
Caller: Ronnell Dover    Relationship to patient: Self    Best call back number: [phone]    Chief complaint: ROUTINE F/U NEEDS MEDS RENEWED     Type of visit: F/U    Requested date: ASAP     Additional notes:PT CALLED YESTERDAY TO GET AN APPT, TOLD HIM THIS MAY TAKE ANYWHERE FROM 24-48 HOURS FOR THE  TO FOLLOW UP ON THIS REQUEST. LAST PN HAS NO RETURN DATE. PLEASE ADVISE.

## 2023-11-16 DIAGNOSIS — I10 ESSENTIAL HYPERTENSION: ICD-10-CM

## 2023-11-16 DIAGNOSIS — R94.30 CARDIAC LEFT VENTRICULAR EJECTION FRACTION 30-35 PERCENT: ICD-10-CM

## 2023-11-16 DIAGNOSIS — I50.9 ACUTE CONGESTIVE HEART FAILURE, UNSPECIFIED HEART FAILURE TYPE: ICD-10-CM

## 2023-11-16 RX ORDER — SACUBITRIL AND VALSARTAN 49; 51 MG/1; MG/1
TABLET, FILM COATED ORAL
Qty: 180 TABLET | Refills: 0 | Status: SHIPPED | OUTPATIENT
Start: 2023-11-16

## 2024-03-07 ENCOUNTER — TELEPHONE (OUTPATIENT)
Dept: CARDIOLOGY | Facility: CLINIC | Age: 79
End: 2024-03-07
Payer: MEDICARE

## 2024-03-07 NOTE — TELEPHONE ENCOUNTER
----- Message from Sharona Austin sent at 3/6/2024  1:57 PM EST -----  Pt has appt next thursay for follow up on meds. Pt last seen 7-2022. Pt lives in [place] and has COPD and has to have breathing treatments every 3 hours which makes it hard for him to come but he needs medication. Pt asked if he could do this over the phone, I told him we don't do that anymore but pt said he can't make it that long without a treatment so I told pt I would ask

## 2024-03-11 NOTE — TELEPHONE ENCOUNTER
I called and spoke with patient,advised he could bring his breathing treatment with him at visit. He states he drives an hour, and even after getting in car needs one. Bringing in treatments at visits, is not an option for him he feels it is dangerous. I advised would get more infor and return call.

## 2024-03-13 ENCOUNTER — TELEPHONE (OUTPATIENT)
Dept: CARDIOLOGY | Facility: CLINIC | Age: 79
End: 2024-03-13

## 2024-03-13 NOTE — TELEPHONE ENCOUNTER
Caller: Ronnell Dover    Relationship to patient: Self    Best call back number: [phone]    Chief complaint: ISSUE WITH MEDICATION     Type of visit: FOLLOW UP    Requested date: ASAP AROUND 1 PM          Additional notes:PATIENT IS NEEDING A FOLLOW UP APPOINTMENT AND FIRST AVAILABLE IS NOT UNTIL JULY. PATIENT IS ALSO HAVING ISSUES WITH THE  CARVEDILOL 12.5 MG 2 TIMES DAILY.  PATIENT NEEDS TO COME IN WHEEL CHAIR AND NEEDS APPOINTMENT TO BE AROUND 1 PM.  PLEASE REACH OUT TO PATIENT AS EARLY IN THE MORNING AS POSSIBLE.

## 2024-03-13 NOTE — TELEPHONE ENCOUNTER
Patient reports severe COPD. He states 30-45 minutes after taking Carvedilol, he is really tight chested and hard to breath. This normally goes on for at leat 2 hours after each dose.  He has not taken Carvedilol the last 2 days to see if it helps with breathing and he states he feels much better. BP today 138/68, he does not monitor HR. He request any medication changes be sent to [facility] in [place]. He would like to be called back at [phone].

## 2024-03-13 NOTE — TELEPHONE ENCOUNTER
Patient notified of recommendations. He will continue to hold Carvedilol. Message routed to front office for appointment.     Umberto Dover MD Cheek, [place], MA  Caller: Unspecified (Today,  8:24 AM)  Continue to hold Coreg and needs 1-2 week f/u.

## 2024-03-19 ENCOUNTER — OFFICE VISIT (OUTPATIENT)
Dept: CARDIOLOGY | Facility: CLINIC | Age: 79
End: 2024-03-19
Payer: MEDICARE

## 2024-03-19 VITALS
HEIGHT: 66 IN | BODY MASS INDEX: 21.69 KG/M2 | OXYGEN SATURATION: 100 % | HEART RATE: 100 BPM | DIASTOLIC BLOOD PRESSURE: 61 MMHG | SYSTOLIC BLOOD PRESSURE: 119 MMHG | WEIGHT: 135 LBS

## 2024-03-19 DIAGNOSIS — I10 ESSENTIAL HYPERTENSION: ICD-10-CM

## 2024-03-19 DIAGNOSIS — I25.10 CORONARY ARTERY DISEASE INVOLVING NATIVE CORONARY ARTERY OF NATIVE HEART WITHOUT ANGINA PECTORIS: ICD-10-CM

## 2024-03-19 DIAGNOSIS — I25.5 ISCHEMIC CARDIOMYOPATHY: Primary | ICD-10-CM

## 2024-03-19 DIAGNOSIS — I50.22 CHRONIC HFREF (HEART FAILURE WITH REDUCED EJECTION FRACTION): ICD-10-CM

## 2024-03-19 DIAGNOSIS — R06.02 SHORTNESS OF BREATH: ICD-10-CM

## 2024-03-19 PROCEDURE — 3078F DIAST BP <80 MM HG: CPT | Performed by: CLINICAL NURSE SPECIALIST

## 2024-03-19 PROCEDURE — 99214 OFFICE O/P EST MOD 30 MIN: CPT | Performed by: CLINICAL NURSE SPECIALIST

## 2024-03-19 PROCEDURE — 3074F SYST BP LT 130 MM HG: CPT | Performed by: CLINICAL NURSE SPECIALIST

## 2024-03-19 RX ORDER — BUDESONIDE AND FORMOTEROL FUMARATE DIHYDRATE 160; 4.5 UG/1; UG/1
2 AEROSOL RESPIRATORY (INHALATION)
COMMUNITY

## 2024-03-19 RX ORDER — FUROSEMIDE 20 MG/1
20 TABLET ORAL DAILY
COMMUNITY

## 2024-03-19 NOTE — PROGRESS NOTES
Subjective     Ronnell Dover is a 79 y.o. male who presents today for Follow-up (Med refills/Discuss Carvedilol causing SOA) and Edema.    CHIEF COMPLIANT  Chief Complaint   Patient presents with    Follow-up     Med refills  Discuss Carvedilol causing SOA    Edema       Active Problems:     1. Coronary artery disease   1.1 Stenting in circumflex 08/2010 by Dr. MAHONEY and stenting RCA 10/20/2010 Dr. MAHONEY   1.2 Stenting, 1999, inadequate data.  1.3 cardiac catheterization February 2018 demonstrating a chronic total occlusion of the right coronary artery with collateralization noted.  Nonobstructive disease otherwise and medical measures recommended  1.4 echocardiogram 5/21: Akinesis of the inferior basilar and inferior lateral and significant hypokinesis of the diaphragmatic and lateral segments with an estimated ejection fraction of 30 to 35%.  Mild concentric left ventricular hypertrophy grade 1A diastolic dysfunction moderate left atrial enlargement.  There is trivial to mild MR trivial AI trivial TR  2.  Ischemic cardiomyopathy  2.1 EF by echocardiogram May 2022 estimated at 30 to 35%  3.  Chronic systolic heart failure  4. Diabetes   5. Essential Hypertension   6. Mixed Hyperlipidemia   7.  COPD    HPI  The patient is a 79-year-old male that returns for routine follow-up.  The patient states that over the past 2 weeks he had noticed increased shortness of air which occurred about 1-1 and half hours after he took his medications in the morning in the evening.  He believed this was related to one of the medications he was taking.  He narrowed this down to either his carvedilol or metformin.  He stopped taking the carvedilol and the symptoms subsided.  He does have significant COPD which he feels has been getting progressively worse.  He states from a heart standpoint everything has been relatively stable.  He denies chest pain.  He feels his shortness of air is attributed to his COPD.  He does have lower extremity edema  which is in his left leg.  He states that he has had ultrasounds on this leg to rule out DVT and that it has been swollen since he had COVID 3 years ago.  He has no edema in the right leg.  The patient is significantly limited in his activity level due to his COPD.  He states it is very difficult for him to travel to come to our office for a visit due to his symptoms.      PRIOR MEDS  Current Outpatient Medications on File Prior to Visit   Medication Sig Dispense Refill    albuterol (PROVENTIL) (2.5 MG/3ML) 0.083% nebulizer solution prn      albuterol sulfate  (90 Base) MCG/ACT inhaler Inhale 2 puffs Every 4 (Four) Hours As Needed for Wheezing.      aspirin 81 MG EC tablet Take 1 tablet by mouth Daily. 30 tablet 11    atorvastatin (LIPITOR) 10 MG tablet Take 1 tablet by mouth Daily.      azithromycin (ZITHROMAX) 250 MG tablet Take 1 tablet by mouth. Takes M/W/F      budesonide (PULMICORT) 0.5 MG/2ML nebulizer solution 2 (Two) Times a Day.      budesonide-formoterol (SYMBICORT) 160-4.5 MCG/ACT inhaler Inhale 2 puffs 2 (Two) Times a Day.      clopidogrel (PLAVIX) 75 MG tablet Daily.      Daliresp 500 MCG tablet tablet Take  by mouth Daily.      furosemide (LASIX) 20 MG tablet Take 1 tablet by mouth Daily.      Insulin Glargine (LANTUS SOLOSTAR) 100 UNIT/ML injection pen Inject 10 Units under the skin into the appropriate area as directed Daily.      IPRATROPIUM BROMIDE IN Inhale Every 6 (Six) Hours As Needed.      metFORMIN (GLUCOPHAGE) 1000 MG tablet Take 1 tablet by mouth 2 (Two) Times a Day With Meals.      nitroglycerin (NITROSTAT) 0.4 MG SL tablet Place 1 tablet under the tongue Every 5 (Five) Minutes As Needed for Chest Pain. Take no more than 3 doses in 15 minutes. 25 tablet 3    O2 (OXYGEN) Inhale 5 L/min 1 (One) Time.      pantoprazole (PROTONIX) 40 MG EC tablet Take 1 tablet by mouth Daily.      predniSONE (DELTASONE) 5 MG tablet Take 1.5 tablets by mouth Daily.      sacubitril-valsartan (Entresto)  49-51 MG tablet TAKE 1 TABLET TWICE A DAY (NO MORE REFILLS UNTIL SEEN HERE AT THE OFFICE) 180 tablet 0    Spiriva Respimat 2.5 MCG/ACT aerosol solution inhaler Daily.      [DISCONTINUED] carvedilol (COREG) 6.25 MG tablet Take 2 tablets by mouth 2 (Two) Times a Day With Meals. 180 tablet 3    [DISCONTINUED] potassium chloride 10 MEQ CR tablet Take 1 tablet by mouth Daily. 30 tablet 11    [DISCONTINUED] Brovana 15 MCG/2ML nebulizer solution 2 (Two) Times a Day.      [DISCONTINUED] empagliflozin (Jardiance) 10 MG tablet tablet Take 1 tablet by mouth Daily. 30 tablet 5    [DISCONTINUED] furosemide (LASIX) 40 MG tablet Take 1 tablet by mouth Daily. 30 tablet 5    [DISCONTINUED] nystatin (MYCOSTATIN) 100,000 unit/mL suspension SWISH AND SWALLOW 10 MILLILITERS BY MOUTH EVERY 8 HOURS AS NEEDED       No current facility-administered medications on file prior to visit.       ALLERGIES  Penicillins, Amlodipine, and Lasix [furosemide]    HISTORY  Past Medical History:   Diagnosis Date    Angina pectoris     Basal cell carcinoma of right side of nose     Cancer     Skin    CHF (congestive heart failure)     COPD (chronic obstructive pulmonary disease)     Coronary artery disease     Diabetes mellitus     Hyperlipidemia     Hypertension     Skin cancer (melanoma)     spot on ear       Social History     Socioeconomic History    Marital status:    Tobacco Use    Smoking status: Some Days     Current packs/day: 0.10     Types: Cigarettes    Smokeless tobacco: Never   Substance and Sexual Activity    Alcohol use: No    Drug use: No    Sexual activity: Defer       Family History   Problem Relation Age of Onset    No Known Problems Mother     Heart disease Father     Stroke Brother        Review of Systems   Constitutional:  Positive for fatigue. Negative for chills, diaphoresis and fever.   HENT: Negative.     Eyes: Negative.  Negative for visual disturbance (glasses).   Respiratory:  Positive for cough, chest tightness,  "shortness of breath (due to COPD) and wheezing. Negative for apnea.         5 L O2 continuous   Cardiovascular:  Positive for chest pain and leg swelling (LT foot; Lasix no longer helps with swelling). Negative for palpitations.   Gastrointestinal:  Positive for constipation (due to prednisone; takes laxative as needed). Negative for blood in stool, diarrhea, nausea and vomiting.   Endocrine: Negative.  Negative for cold intolerance and heat intolerance.   Genitourinary: Negative.  Negative for hematuria.   Musculoskeletal: Negative.  Negative for arthralgias, back pain, myalgias, neck pain and neck stiffness.   Skin:  Negative for color change, rash and wound.        Bruises easily    Allergic/Immunologic: Negative.  Negative for environmental allergies and food allergies.   Neurological:  Positive for weakness. Negative for dizziness, syncope, light-headedness, numbness and headaches.   Hematological:  Bruises/bleeds easily.   Psychiatric/Behavioral:  Positive for sleep disturbance (due to COPD).        Objective     VITALS: /61 (BP Location: Left arm, Patient Position: Sitting)   Pulse 100   Ht 167.6 cm (66\")   Wt 61.2 kg (135 lb) Comment: Pt refused to weigh  SpO2 100% Comment: 5L O2  BMI 21.79 kg/m²     LABS:   Lab Results (most recent)       None            IMAGING:   No Images in the past 120 days found..    EXAM:  Physical Exam  Constitutional:       Appearance: Normal appearance.   Eyes:      Pupils: Pupils are equal, round, and reactive to light.   Cardiovascular:      Rate and Rhythm: Normal rate and regular rhythm.      Pulses:           Carotid pulses are 2+ on the right side and 2+ on the left side.       Radial pulses are 2+ on the right side and 2+ on the left side.        Dorsalis pedis pulses are 2+ on the right side and 2+ on the left side.        Posterior tibial pulses are 2+ on the right side and 2+ on the left side.      Heart sounds: Normal heart sounds.   Pulmonary:      Effort: " Pulmonary effort is normal.      Breath sounds: Wheezing present.   Abdominal:      General: Bowel sounds are normal.      Palpations: Abdomen is soft.   Musculoskeletal:      Right lower leg: No edema.      Left lower leg: Edema present.   Skin:     General: Skin is warm and dry.      Capillary Refill: Capillary refill takes less than 2 seconds.   Neurological:      General: No focal deficit present.      Mental Status: He is alert and oriented to person, place, and time.   Psychiatric:         Mood and Affect: Mood normal.         Thought Content: Thought content normal.         Procedure   Procedures       Assessment & Plan    Diagnosis Plan   1. Ischemic cardiomyopathy        2. Coronary artery disease involving native coronary artery of native heart without angina pectoris        3. Essential hypertension        4. Shortness of breath        5. Chronic HFrEF (heart failure with reduced ejection fraction)          Plan:  1.  Ischemic cardiomyopathy: Will continue Entresto.  The patient stopped carvedilol as this was causing dyspnea after both the morning dose and evening dose.  Once he stopped taking this medication his symptoms did improve.  We did discuss starting Toprol but the patient prefers to remain off of a beta-blocker at this time.  He states his pulmonary status is fragile due to his severe COPD and he does not feel it is worth the risk to start another beta-blocker at this time.  He was advised to notify our office if his symptoms return or worsen.  He was previously on Jardiance but also stopped this medication as he does not feel he tolerated it well.  2.  CAD: The patient denies current chest pain.  Will continue medical management including statin, Entresto.  He is going to remain off beta-blocker at this time.  He is on both Plavix and aspirin but states he is having significant bruising.  He would like to stop 1 of these medications.  After discussion we agreed that he could stop the aspirin  but would remain on the Plavix moving forward.  The patient did discuss that it appeared he had previously refused further cardiac testing, however, this was not the case.  The patient states that if absolutely necessary he would consider additional cardiac testing but unless it was a last resort he would not want to undergo a heart cath as it is difficult for him to lay on the table due to COPD.  3.  Essential hypertension: Blood pressures under good control with current medication regimen.  The patient was instructed to monitor BP at home and to notify our office if systolic BP is consistently greater than 130-135 systolic.  Goal BP is 130-135/70-80.  4.  Shortness of breath: The patient does have significant baseline shortness of breath due to severe COPD.  He does follow with pulmonary.  The patient feels that his symptoms are associated with COPD and from a cardiac standpoint he feels his symptoms are stable.    The patient would like to discuss future visits be done via telehealth as travel to our office is very difficult for him due to his chronic conditions.  We will need to follow him on an annual basis.  I did tell him I would discuss with the  to see if telehealth visits would be possible.  Return in about 1 year (around 3/19/2025).    Ronnell Dover  reports that he has been smoking cigarettes. He has never used smokeless tobacco. I have educated him on the risk of diseases from using tobacco products such as cancer, COPD, and heart disease.     I advised him to quit and he is not willing to quit.    I spent 3  minutes counseling the patient.       Patient brought in medicine list to appointment, it's been reviewed with patient and med list was updated in the chart.       BMI is within normal parameters. No other follow-up for BMI required.    Advance Care Planning   ACP discussion was declined by the patient. Patient does not have an advance directive, declines further assistance.             MEDS ORDERED DURING VISIT:  No orders of the defined types were placed in this encounter.      DISCONTINUED MEDS DURING VISIT:   Medications Discontinued During This Encounter   Medication Reason    Brovana 15 MCG/2ML nebulizer solution Patient Reported Not Taking    carvedilol (COREG) 6.25 MG tablet Patient Reported Not Taking    furosemide (LASIX) 40 MG tablet Dose adjustment    empagliflozin (Jardiance) 10 MG tablet tablet Patient Reported Not Taking    nystatin (MYCOSTATIN) 100,000 unit/mL suspension Patient Reported Not Taking    potassium chloride 10 MEQ CR tablet Patient Reported Not Taking          This document has been electronically signed by SUSAN Khan  March 19, 2024 18:10 EDT    Dictated Utilizing Dragon Dictation: Part of this note may be an electronic transcription/translation of spoken language to printed text using the Dragon Dictation System

## 2024-10-21 ENCOUNTER — TELEPHONE (OUTPATIENT)
Dept: CARDIOLOGY | Facility: CLINIC | Age: 79
End: 2024-10-21
Payer: MEDICARE

## 2024-10-21 NOTE — TELEPHONE ENCOUNTER
PATIENT STATES [facility] WILL NO LONGER BE ABLE TO FILL HIS ENTRESTO ANYMORE DUE TO WILL NOT BE ABLE TO ADDRESS IT. HE IS GOING TO FIND ANOTHER PHARMACY WHERE HE CAN GET A 90 DAY SUPPLY AND AT WHAT COST AND LET OUR OFFICE KNOW SO SCRIPT CAN BE SENT TO THEM. PH,LPN

## 2024-10-21 NOTE — TELEPHONE ENCOUNTER
Caller: Ronnell Dover    Relationship: Self    Best call back number: [phone]    What is the best time to reach you: ANY    Who are you requesting to speak with (clinical staff, provider,  specific staff member): CLINICAL      What was the call regarding: PT SPOKE TO [facility] AND THEY STATED THEY AREN'T GOING TO BE REFILLING HIS ENTRESTO MEDICATION ANYMORE, DID NOT GIVE A REASON TO WHY.     HE IS WONDERING WHAT HE NEEDS TO DO NOW TO GET THIS MEDICATION. MOST LOCAL PHARMACIES WILL NOT DO A 90 DAY SUPPLY AS WELL, THEY ARE MUCH MORE EXPENSIVE. HE IS WONDERING IF WE CAN HELP FIGURE THIS

## 2025-07-25 ENCOUNTER — OUTSIDE FACILITY SERVICE (OUTPATIENT)
Dept: CARDIOLOGY | Facility: CLINIC | Age: 80
End: 2025-07-25
Payer: MEDICARE